# Patient Record
Sex: MALE | Race: BLACK OR AFRICAN AMERICAN | NOT HISPANIC OR LATINO | Employment: OTHER | URBAN - METROPOLITAN AREA
[De-identification: names, ages, dates, MRNs, and addresses within clinical notes are randomized per-mention and may not be internally consistent; named-entity substitution may affect disease eponyms.]

---

## 2017-05-17 ENCOUNTER — OFFICE VISIT (OUTPATIENT)
Dept: PSYCHIATRY | Facility: CLINIC | Age: 82
End: 2017-05-17
Payer: MEDICARE

## 2017-05-17 VITALS
HEIGHT: 68 IN | SYSTOLIC BLOOD PRESSURE: 147 MMHG | WEIGHT: 179 LBS | HEART RATE: 69 BPM | DIASTOLIC BLOOD PRESSURE: 67 MMHG | BODY MASS INDEX: 27.13 KG/M2

## 2017-05-17 DIAGNOSIS — F45.21 HYPOCHONDRIASIS: Primary | ICD-10-CM

## 2017-05-17 DIAGNOSIS — K59.09 CHRONIC CONSTIPATION: ICD-10-CM

## 2017-05-17 DIAGNOSIS — F34.1 DYSTHYMIC DISORDER: ICD-10-CM

## 2017-05-17 PROCEDURE — 99999 PR PBB SHADOW E&M-EST. PATIENT-LVL III: CPT | Mod: PBBFAC,,, | Performed by: PSYCHIATRY & NEUROLOGY

## 2017-05-17 PROCEDURE — 90833 PSYTX W PT W E/M 30 MIN: CPT | Mod: S$GLB,,, | Performed by: PSYCHIATRY & NEUROLOGY

## 2017-05-17 PROCEDURE — 99499 UNLISTED E&M SERVICE: CPT | Mod: S$GLB,,, | Performed by: PSYCHIATRY & NEUROLOGY

## 2017-05-17 PROCEDURE — 99213 OFFICE O/P EST LOW 20 MIN: CPT | Mod: S$GLB,,, | Performed by: PSYCHIATRY & NEUROLOGY

## 2017-05-17 PROCEDURE — 1160F RVW MEDS BY RX/DR IN RCRD: CPT | Mod: S$GLB,,, | Performed by: PSYCHIATRY & NEUROLOGY

## 2017-05-17 PROCEDURE — 1159F MED LIST DOCD IN RCRD: CPT | Mod: S$GLB,,, | Performed by: PSYCHIATRY & NEUROLOGY

## 2017-05-17 RX ORDER — MIRTAZAPINE 15 MG/1
15 TABLET, FILM COATED ORAL NIGHTLY
Qty: 90 TABLET | Refills: 1 | Status: SHIPPED | OUTPATIENT
Start: 2017-05-17 | End: 2021-04-23 | Stop reason: ALTCHOICE

## 2017-05-17 NOTE — MR AVS SNAPSHOT
[facility] - Psychiatry  [address]  [place] [address]  Phone: [phone]  Fax: [phone]                  Ish aMradiaga   2017 2:30 PM   Office Visit    Description:  Male : 1932   Provider:  Robert Owens Jr., MD   Department:  [facility] - Psychiatry           Reason for Visit     Depression     Somatic Complaints           Diagnoses this Visit        Comments    Hypochondriasis    -  Primary     Dysthymic disorder         Chronic constipation                To Do List           Future Appointments        Provider Department Dept Phone    2017 1:00 PM MD Marshall Rey Jr. [facility] [phone]      Goals (5 Years of Data)     None      Follow-Up and Disposition     Return in about 6 months (around 2017).       These Medications        Disp Refills Start End    mirtazapine (REMERON) 15 MG tablet 90 tablet 1 2017     Take 1 tablet (15 mg total) by mouth every evening. - Oral    Pharmacy: [facility]/pharm[facility] #[id number] - [place] - [address]  #: [phone]         [facility]     [facility] Care Line -  Assistance  Unless otherwise directed by your provider, please contact [facility], our nurse care line that is available for  assistance.     Registered nurses in the [facility] provide: appointment scheduling, clinical advisement, health education, and other advisory services.  Call: [phone] (toll free)               Medications           Message regarding Medications     Verify the changes and/or additions to your medication regime listed below are the same as discussed with your clinician today.  If any of these changes or additions are incorrect, please notify your healthcare provider.             Verify that the below list of medications is an accurate representation of the medications you are currently taking.  If none reported, the list may be blank. If incorrect, please  "contact your healthcare provider. Carry this list with you in case of emergency.           Current Medications     docusate sodium (COLACE) 100 MG capsule Take 1 capsule (100 mg total) by mouth 2 (two) times daily as needed for Constipation.    mirtazapine (REMERON) 15 MG tablet Take 1 tablet (15 mg total) by mouth every evening.    RANITIDINE HCL (ZANTAC ORAL) Take by mouth.           Clinical Reference Information           Your Vitals Were     BP Pulse Height Weight BMI    147/67 69 5' 8" (1.727 m) 81.2 kg (179 lb) 27.22 kg/m2      Blood Pressure          Most Recent Value    BP  (!)  147/67      Allergies as of 5/17/2017     No Known Allergies      Immunizations Administered on Date of Encounter - 5/17/2017     None      Orders Placed During Today's Visit      Normal Orders This Visit    Psy Authorization: Pharm + Psychotherapy,  6 x/yr       [facility] Sign-Up     Activating your [facility] account is as easy as 1-2-3!     1) Visit [email or web address], select Sign Up Now, enter this activation code and your date of birth, then select Next.  [email or web address]  Expires: 7/1/2017  3:01 PM      2) Create a username and password to use when you visit [facility] in the future and select a security question in case you lose your password and select Next.    3) Enter your e-mail address and click Sign Up!    Additional Information  If you have questions, please e-mail [email or web address] or call [phone] to talk to our [facility] staff. Remember, [facility] is NOT to be used for urgent needs. For medical emergencies, dial 911.         Instructions    Call if problems arise.  Go to ER if in crisis.         Language Assistance Services     ATTENTION: Language assistance services are available, free of charge. Please call [phone].      ATENCIÓN: Si montana shi, tiene a harper disposición servicios gratuitos de asistencia lingüística. Llame al [phone].     CHÚ Ý: N?u b?n nói Ti?ng Vi?t, có các d?ch v? h? tr? ngôn " ng? mi?n phí dành cho b?n. G?i s? [phone].         [facility] complies with applicable Federal civil rights laws and does not discriminate on the basis of race, color, national origin, age, disability, or sex.

## 2017-05-17 NOTE — PROGRESS NOTES
Outpatient Psychiatry Follow-Up Visit (MD/NP)    5/17/2017    Clinical Status of Patient:  Outpatient (Ambulatory)    Chief Complaint:  Ish Maradiaga is a 84 y.o. male who presents today for follow-up of Depression and Somatic Complaints    Met with patient.      Interval History and Content of Current Session:  Interim Events/Subjective Report/Content of Current Session:  Pt returned casually dressed and groomed, ambulating slowly with a walker.  He provided an update on his activities, which have changed little since last year.  His major concern now is his son, who he says abuses drugs, has lost his job and girlfriend, and is now living with Pt full time.  Pt would like to believe that the drug use is secondary to his son not finding suitable employment, but he agreed that it could be the other way around and he may only be enabling his son.  He is realistic enough to consider his options, which include moving to a senior living apartment here or back in [place], [place], which he has discussed here before.    Medications were reviewed.  Pt is pleased with his present tx.  He still has constipation, but he has not been back to the ED with it as he frequently did in the past.  He does acknowledge a need to eat better.  Psychotropics were refilled unchanged and he will return here in 6 months.    Psychotherapy:  · Target symptoms: hypochondriasis.  anxiety. irritability.  depression.  · Why chosen therapy is appropriate versus another modality: relevant to diagnosis, patient responds to this modality  · Outcome monitoring methods: self-report, observation  · Therapeutic intervention type: behavior modifying psychotherapy, supportive psychotherapy  · Topics discussed/themes: stress related to medical comorbidities, building skills sets for symptom management  · The patient's response to the intervention is motivated. The patient's progress toward treatment goals is fair.   · Duration of intervention: 23 min    Review of  "Systems   · PSYCHIATRIC: Pertinant items are noted in the narrative.  · MUSCULOSKELETAL: leg weakness  · GASTROINTESTINAL: Constipation.    Past Medical, Family and Social History: The patient's past medical, family and social history have been reviewed and updated as appropriate within the electronic medical record - see encounter notes.    Compliance: yes    Side effects: None    Risk Parameters:  Patient reports no suicidal ideation  Patient reports no homicidal ideation  Patient reports no self-injurious behavior  Patient reports no violent behavior    Exam (detailed: at least 9 elements; comprehensive: all 15 elements)   Constitutional  Vitals:  Most recent vital signs, dated less than 90 days prior to this appointment, were reviewed.    BP (!) 147/67  Pulse 69  Ht 5' 8" (1.727 m)  Wt 81.2 kg (179 lb)  BMI 27.22 kg/m2     General:  age appropriate, casually dressed     Musculoskeletal  Muscle Strength/Tone:  no rigidity, no dyskinesia, no dystonia, no tremor   Gait & Station:  uses walker     Psychiatric  Speech:  spontaneous   Mood & Affect:  serious  congruent and appropriate   Thought Process:  goal-directed, logical   Associations:  intact   Thought Content:  normal, no suicidality, no homicidality, delusions, or paranoia He is focused on G.I. Symptoms.   Insight & Judgement:  good  good   Orientation:  grossly intact   Memory: intact for content of interview   Language: grossly intact   Attention Span & Concentration:  able to focus   Fund of Knowledge:  intact and appropriate to age and level of education     Assessment and Diagnosis   Status/Progress: Based on the examination today, the patient's problem(s) is/are adequately but not ideally controlled.  New problems have been presented today.   Comorbidities are not complicating management of the primary condition.  There are no active rule-out diagnoses for this patient at this time.    General Impression:  Pt has developed insight into his previous " illness and behaviors, as well as awareness of benefit from Remeron.    Axis I: SOMATOFORM DISORDERS; Hypochondriasis (300.7).  Axis II: NO DIAGNOSIS ON AXIS II (V71.09)  Axis III: healthy  Axis IV: problems with primary support group  Axis V: 71-80 If symptoms are present, they are transient and expectable reactions to psychosocial stressors (e.g. difficulty concentrating after family argument); no more than slight impairment in social, occupational or school functioning (e.g. temporarily falling behind in schoolwork)    Intervention/Counseling/Treatment Plan   · Medication Management: Continue current medications.   · Call if planning to move.    Return to Clinic: 6 months

## 2018-05-17 ENCOUNTER — PES CALL (OUTPATIENT)
Dept: ADMINISTRATIVE | Facility: CLINIC | Age: 83
End: 2018-05-17

## 2018-07-20 ENCOUNTER — PES CALL (OUTPATIENT)
Dept: ADMINISTRATIVE | Facility: CLINIC | Age: 83
End: 2018-07-20

## 2018-09-17 ENCOUNTER — PES CALL (OUTPATIENT)
Dept: ADMINISTRATIVE | Facility: CLINIC | Age: 83
End: 2018-09-17

## 2019-06-17 ENCOUNTER — PES CALL (OUTPATIENT)
Dept: ADMINISTRATIVE | Facility: CLINIC | Age: 84
End: 2019-06-17

## 2020-10-07 ENCOUNTER — PES CALL (OUTPATIENT)
Dept: ADMINISTRATIVE | Facility: CLINIC | Age: 85
End: 2020-10-07

## 2021-06-15 ENCOUNTER — PES CALL (OUTPATIENT)
Dept: ADMINISTRATIVE | Facility: CLINIC | Age: 86
End: 2021-06-15

## 2021-07-15 ENCOUNTER — EXTERNAL HOSPITAL ADMISSION (OUTPATIENT)
Dept: ADMINISTRATIVE | Facility: CLINIC | Age: 86
End: 2021-07-15

## 2021-07-15 ENCOUNTER — PATIENT OUTREACH (OUTPATIENT)
Dept: ADMINISTRATIVE | Facility: CLINIC | Age: 86
End: 2021-07-15

## 2021-07-15 DIAGNOSIS — Z20.822 ENCOUNTER FOR LABORATORY TESTING FOR COVID-19 VIRUS: ICD-10-CM

## 2021-07-22 ENCOUNTER — LAB VISIT (OUTPATIENT)
Dept: LAB | Facility: OTHER | Age: 86
End: 2021-07-22
Payer: MEDICARE

## 2021-07-22 DIAGNOSIS — Z20.822 ENCOUNTER FOR LABORATORY TESTING FOR COVID-19 VIRUS: ICD-10-CM

## 2021-07-22 PROCEDURE — U0003 INFECTIOUS AGENT DETECTION BY NUCLEIC ACID (DNA OR RNA); SEVERE ACUTE RESPIRATORY SYNDROME CORONAVIRUS 2 (SARS-COV-2) (CORONAVIRUS DISEASE [COVID-19]), AMPLIFIED PROBE TECHNIQUE, MAKING USE OF HIGH THROUGHPUT TECHNOLOGIES AS DESCRIBED BY CMS-2020-01-R: HCPCS | Performed by: NURSE PRACTITIONER

## 2021-07-23 LAB
SARS-COV-2 RNA RESP QL NAA+PROBE: NOT DETECTED
SARS-COV-2- CYCLE NUMBER: -1

## 2021-07-29 ENCOUNTER — LAB VISIT (OUTPATIENT)
Dept: LAB | Facility: OTHER | Age: 86
End: 2021-07-29
Payer: MEDICARE

## 2021-07-29 DIAGNOSIS — Z20.822 ENCOUNTER FOR LABORATORY TESTING FOR COVID-19 VIRUS: ICD-10-CM

## 2021-07-29 PROCEDURE — U0003 INFECTIOUS AGENT DETECTION BY NUCLEIC ACID (DNA OR RNA); SEVERE ACUTE RESPIRATORY SYNDROME CORONAVIRUS 2 (SARS-COV-2) (CORONAVIRUS DISEASE [COVID-19]), AMPLIFIED PROBE TECHNIQUE, MAKING USE OF HIGH THROUGHPUT TECHNOLOGIES AS DESCRIBED BY CMS-2020-01-R: HCPCS | Performed by: NURSE PRACTITIONER

## 2021-07-30 LAB
SARS-COV-2 RNA RESP QL NAA+PROBE: NOT DETECTED
SARS-COV-2- CYCLE NUMBER: -1

## 2021-08-05 ENCOUNTER — LAB VISIT (OUTPATIENT)
Dept: LAB | Facility: OTHER | Age: 86
End: 2021-08-05
Payer: MEDICARE

## 2021-08-05 DIAGNOSIS — Z20.822 ENCOUNTER FOR LABORATORY TESTING FOR COVID-19 VIRUS: ICD-10-CM

## 2021-08-05 PROCEDURE — U0003 INFECTIOUS AGENT DETECTION BY NUCLEIC ACID (DNA OR RNA); SEVERE ACUTE RESPIRATORY SYNDROME CORONAVIRUS 2 (SARS-COV-2) (CORONAVIRUS DISEASE [COVID-19]), AMPLIFIED PROBE TECHNIQUE, MAKING USE OF HIGH THROUGHPUT TECHNOLOGIES AS DESCRIBED BY CMS-2020-01-R: HCPCS | Performed by: NURSE PRACTITIONER

## 2021-08-07 LAB
SARS-COV-2 RNA RESP QL NAA+PROBE: NORMAL
TEST PERFORMANCE INFO SPEC: NORMAL

## 2021-08-12 ENCOUNTER — LAB VISIT (OUTPATIENT)
Dept: LAB | Facility: OTHER | Age: 86
End: 2021-08-12
Payer: MEDICARE

## 2021-08-12 DIAGNOSIS — Z20.822 ENCOUNTER FOR LABORATORY TESTING FOR COVID-19 VIRUS: ICD-10-CM

## 2021-08-12 PROCEDURE — U0003 INFECTIOUS AGENT DETECTION BY NUCLEIC ACID (DNA OR RNA); SEVERE ACUTE RESPIRATORY SYNDROME CORONAVIRUS 2 (SARS-COV-2) (CORONAVIRUS DISEASE [COVID-19]), AMPLIFIED PROBE TECHNIQUE, MAKING USE OF HIGH THROUGHPUT TECHNOLOGIES AS DESCRIBED BY CMS-2020-01-R: HCPCS | Performed by: NURSE PRACTITIONER

## 2021-08-13 LAB
SARS-COV-2 RNA RESP QL NAA+PROBE: NOT DETECTED
SARS-COV-2- CYCLE NUMBER: -1

## 2021-08-19 ENCOUNTER — LAB VISIT (OUTPATIENT)
Dept: LAB | Facility: OTHER | Age: 86
End: 2021-08-19
Payer: MEDICARE

## 2021-08-19 DIAGNOSIS — Z20.822 ENCOUNTER FOR LABORATORY TESTING FOR COVID-19 VIRUS: ICD-10-CM

## 2021-08-19 PROCEDURE — U0003 INFECTIOUS AGENT DETECTION BY NUCLEIC ACID (DNA OR RNA); SEVERE ACUTE RESPIRATORY SYNDROME CORONAVIRUS 2 (SARS-COV-2) (CORONAVIRUS DISEASE [COVID-19]), AMPLIFIED PROBE TECHNIQUE, MAKING USE OF HIGH THROUGHPUT TECHNOLOGIES AS DESCRIBED BY CMS-2020-01-R: HCPCS | Performed by: NURSE PRACTITIONER

## 2021-08-20 LAB
SARS-COV-2 RNA RESP QL NAA+PROBE: NOT DETECTED
SARS-COV-2- CYCLE NUMBER: -1

## 2021-08-26 ENCOUNTER — LAB VISIT (OUTPATIENT)
Dept: LAB | Facility: OTHER | Age: 86
End: 2021-08-26
Payer: MEDICARE

## 2021-08-26 DIAGNOSIS — Z20.822 ENCOUNTER FOR LABORATORY TESTING FOR COVID-19 VIRUS: ICD-10-CM

## 2021-08-26 PROCEDURE — U0003 INFECTIOUS AGENT DETECTION BY NUCLEIC ACID (DNA OR RNA); SEVERE ACUTE RESPIRATORY SYNDROME CORONAVIRUS 2 (SARS-COV-2) (CORONAVIRUS DISEASE [COVID-19]), AMPLIFIED PROBE TECHNIQUE, MAKING USE OF HIGH THROUGHPUT TECHNOLOGIES AS DESCRIBED BY CMS-2020-01-R: HCPCS | Performed by: NURSE PRACTITIONER

## 2021-08-29 LAB — SARS-COV-2 RNA RESP QL NAA+PROBE: NOT DETECTED

## 2022-10-29 ENCOUNTER — LAB VISIT (OUTPATIENT)
Dept: LAB | Facility: HOSPITAL | Age: 87
End: 2022-10-29
Attending: FAMILY MEDICINE
Payer: MEDICARE

## 2022-10-29 DIAGNOSIS — R36.9 URETHRAL DISCHARGE: Primary | ICD-10-CM

## 2022-10-29 PROCEDURE — 87077 CULTURE AEROBIC IDENTIFY: CPT | Performed by: FAMILY MEDICINE

## 2022-10-29 PROCEDURE — 87186 SC STD MICRODIL/AGAR DIL: CPT | Mod: 59 | Performed by: FAMILY MEDICINE

## 2022-10-29 PROCEDURE — 87070 CULTURE OTHR SPECIMN AEROBIC: CPT | Performed by: FAMILY MEDICINE

## 2022-11-01 LAB
BACTERIA SPEC AEROBE CULT: ABNORMAL
BACTERIA SPEC AEROBE CULT: ABNORMAL

## 2023-04-15 ENCOUNTER — APPOINTMENT (OUTPATIENT)
Dept: LAB | Facility: HOSPITAL | Age: 88
End: 2023-04-15
Attending: NURSE PRACTITIONER
Payer: MEDICARE

## 2023-04-15 DIAGNOSIS — R36.9 URETHRAL DISCHARGE: Primary | ICD-10-CM

## 2023-04-15 PROCEDURE — 87186 SC STD MICRODIL/AGAR DIL: CPT

## 2023-04-15 PROCEDURE — 87070 CULTURE OTHR SPECIMN AEROBIC: CPT

## 2023-04-15 PROCEDURE — 87077 CULTURE AEROBIC IDENTIFY: CPT

## 2023-04-18 LAB — BACTERIA SPEC AEROBE CULT: ABNORMAL

## 2023-06-05 ENCOUNTER — TELEPHONE (OUTPATIENT)
Dept: UROLOGY | Facility: CLINIC | Age: 88
End: 2023-06-05
Payer: MEDICARE

## 2023-06-06 ENCOUNTER — OFFICE VISIT (OUTPATIENT)
Dept: UROLOGY | Facility: CLINIC | Age: 88
End: 2023-06-06
Payer: MEDICARE

## 2023-06-06 VITALS
DIASTOLIC BLOOD PRESSURE: 57 MMHG | HEIGHT: 68 IN | HEART RATE: 60 BPM | BODY MASS INDEX: 25.7 KG/M2 | SYSTOLIC BLOOD PRESSURE: 105 MMHG

## 2023-06-06 DIAGNOSIS — N13.8 BPH WITH OBSTRUCTION/LOWER URINARY TRACT SYMPTOMS: Primary | ICD-10-CM

## 2023-06-06 DIAGNOSIS — N40.1 BPH WITH OBSTRUCTION/LOWER URINARY TRACT SYMPTOMS: Primary | ICD-10-CM

## 2023-06-06 LAB — POC RESIDUAL URINE VOLUME: 122 ML (ref 0–100)

## 2023-06-06 PROCEDURE — 99212 OFFICE O/P EST SF 10 MIN: CPT | Mod: PBBFAC,PO | Performed by: UROLOGY

## 2023-06-06 PROCEDURE — 51798 US URINE CAPACITY MEASURE: CPT | Mod: PBBFAC,PO | Performed by: UROLOGY

## 2023-06-06 PROCEDURE — 99204 OFFICE O/P NEW MOD 45 MIN: CPT | Mod: S$PBB,,, | Performed by: UROLOGY

## 2023-06-06 PROCEDURE — 99204 PR OFFICE/OUTPT VISIT, NEW, LEVL IV, 45-59 MIN: ICD-10-PCS | Mod: S$PBB,,, | Performed by: UROLOGY

## 2023-06-06 PROCEDURE — 99999 PR PBB SHADOW E&M-EST. PATIENT-LVL II: CPT | Mod: PBBFAC,,, | Performed by: UROLOGY

## 2023-06-06 PROCEDURE — 99999 PR PBB SHADOW E&M-EST. PATIENT-LVL II: ICD-10-PCS | Mod: PBBFAC,,, | Performed by: UROLOGY

## 2023-06-06 RX ORDER — DEXTROMETHORPHAN HYDROBROMIDE, GUAIFENESIN 5; 100 MG/5ML; MG/5ML
650 LIQUID ORAL EVERY 8 HOURS
COMMUNITY

## 2023-06-06 RX ORDER — ONDANSETRON 4 MG/1
4 TABLET, ORALLY DISINTEGRATING ORAL ONCE
COMMUNITY

## 2023-06-06 RX ORDER — PANTOPRAZOLE SODIUM 40 MG/1
40 TABLET, DELAYED RELEASE ORAL DAILY
COMMUNITY

## 2023-06-06 RX ORDER — MELATONIN 5 MG
CAPSULE ORAL
COMMUNITY

## 2023-06-06 RX ORDER — ZINC SULFATE 50(220)MG
220 CAPSULE ORAL DAILY
COMMUNITY

## 2023-06-06 RX ORDER — DEXTROMETHORPHAN POLISTIREX 30 MG/5ML
60 SUSPENSION ORAL 2 TIMES DAILY
COMMUNITY

## 2023-06-06 RX ORDER — CITALOPRAM 20 MG/1
20 TABLET, FILM COATED ORAL DAILY
COMMUNITY

## 2023-06-06 RX ORDER — DIPHENHYDRAMINE HCL 25 MG
25 TABLET ORAL NIGHTLY PRN
COMMUNITY

## 2023-06-06 RX ORDER — PROMETHAZINE HYDROCHLORIDE 25 MG/1
25 TABLET ORAL EVERY 6 HOURS PRN
COMMUNITY

## 2023-06-06 NOTE — PROGRESS NOTES
"[facility] Urology New Patient/H&P:     Ish Maradiaga is a 89 y.o. male who presents for "cant urinate, foreskin retraction"    He is dropped off by [facility]  Pt is alone  States he has no trouble urinating  Wears a diaper  Goes in the diaper  I am provided with a facesheet from NH noting current dx: "weakness, anemia, MDD" and provides med list. Page 2 lists dx "n/v, abdominal pain, diarrhea, urethral discharge"  Page 3 adds dx "reduced mobility, HTN, constipation, BPH, abnormality of gait, CHF, PVD, personal history of malignant neoplasm of bladder"    Patient denies history of bladder cancer.  Denies any difficulty voiding.  After bladder scan on arrival as he can not provide urine sample of 122, then noted that he urinated again in his diaper.  He reports this is his baseline.  He feels that he has no problems.  He does not know why he is here.  He also denies any trouble with his foreskin    Past Medical History:   Diagnosis Date    Age-related nuclear cataract, bilateral     Anxiety disorder, unspecified     BPH (benign prostatic hyperplasia)     Chronic constipation     Constipation     Dementia     Depression     Functional dyspepsia     Gastritis     Heart failure, unspecified     History of psychiatric care     Other lack of coordination     Peripheral vascular disease, unspecified     Personal history of other diseases of urinary system     Psychiatric problem     Therapy     Vitamin deficiency        Past Surgical History:   Procedure Laterality Date    CERVICAL SPINE SURGERY      HERNIA REPAIR  , 1992    x2       Family History   Problem Relation Age of Onset    Cancer Mother     Depression Brother     Alcohol abuse Brother        Social History     Socioeconomic History    Marital status: Single   Tobacco Use    Smoking status: Former     Types: Cigarettes     Quit date: 1980     Years since quittin.4    Tobacco comments:     quit    Substance and Sexual Activity    " "Alcohol use: No     Comment: "very light" states last etoh about 3 years ago (5/18/13)    Drug use: No    Sexual activity: Never       Review of patient's allergies indicates:  No Known Allergies    Medications Reviewed: see MAR    Focused Physical Exam    Vitals:    06/06/23 1004   BP: (!) 105/57   Pulse: 60     Body mass index is 25.7 kg/m².   Height: 5' 8" (172.7 cm)       Abdomen: Soft, non-tender, nondistended, no CVA tenderness  In wheelchair  : uncirc normal phallus, normal meatus, diaper with urine, foreskin retracts and reduces normally, no discharge      LABS:    Recent Results (from the past 336 hour(s))   POCT Bladder Scan    Collection Time: 06/06/23 10:00 AM   Result Value Ref Range    POC Residual Urine Volume 122 (A) 0 - 100 mL         Assessment/Diagnosis:    1. BPH with obstruction/lower urinary tract symptoms  POCT Bladder Scan          Plans:  Patient denies difficulty urinating or difficulty with foreskin which were the reasons for visit.  He is by himself, brought only by nursing home transport.  I was able to review all records provided including medication list which has extensive bowel regimen.  We discuss the implications of constipation on urinary urgency frequency and incomplete emptying.  He states he simply wears depend because of his mobility and just urinates in his depend when he feels like it and has no problem.  Certainly has no phimosis or difficulty retracting foreskin on exam.  No urethral discharge which was listed as a diagnosis on his paperwork.  He likely has BPH given his age, and may have an element of postvoid residual, and therefore recommended starting Flomax 0.4 mg nightly which I did put on his progress note an order sent back to the nursing home.  As well, noted the line about history of bladder cancer on his face sheet, which he denies.  We discussed possible cystoscopic evaluation or further evaluation but he declines.  Certainly if he has any blood in the urine " in the future or truly can not urinate and goes retention can return for evaluation but otherwise can utilize Flomax 0.4 mg nightly and follow-up as needed.  Will attempted to call son.  These recommendations were provided on physicians progress note and orders signed and returned to [place].    Level of Medical Decision Making  [ _ ]  Low: 2 minor/1 stable chronic/1 acute uncomplicated --- review record/result/order test x2  [ X ]  Mod: 1 chronic exac/2stable chronic/1 acute comp --- review record/result/order test x3 OR independent interp of outside test OR discuss mgmt of outside ordered test --- start drug therapy/plan minor surgery   [ _ ]  High: chronic with exacerbation/progression or trtmnt side effect vs acute/chronic w/ life/body threat ---  (2/3) review record/result/order test x3 OR independent interp of ouutside test OR discuss mgmt of outside ordered test --- drug with monitoring for toxicity, plan major surgery, hospitalize, deescalate/withdraw care bc of prognosis

## 2023-06-11 RX ORDER — TAMSULOSIN HYDROCHLORIDE 0.4 MG/1
0.4 CAPSULE ORAL NIGHTLY
Qty: 30 CAPSULE | Refills: 11
Start: 2023-06-11 | End: 2024-06-10

## 2024-05-30 ENCOUNTER — OFFICE VISIT (OUTPATIENT)
Dept: UROLOGY | Facility: CLINIC | Age: 89
End: 2024-05-30
Payer: MEDICARE

## 2024-05-30 DIAGNOSIS — N13.8 BPH WITH OBSTRUCTION/LOWER URINARY TRACT SYMPTOMS: Primary | ICD-10-CM

## 2024-05-30 DIAGNOSIS — N40.1 BPH WITH OBSTRUCTION/LOWER URINARY TRACT SYMPTOMS: Primary | ICD-10-CM

## 2024-05-30 PROCEDURE — 99214 OFFICE O/P EST MOD 30 MIN: CPT | Mod: S$PBB,,, | Performed by: NURSE PRACTITIONER

## 2024-05-30 PROCEDURE — 99213 OFFICE O/P EST LOW 20 MIN: CPT | Mod: PBBFAC,PO | Performed by: NURSE PRACTITIONER

## 2024-05-30 PROCEDURE — 99999 PR PBB SHADOW E&M-EST. PATIENT-LVL III: CPT | Mod: PBBFAC,,, | Performed by: NURSE PRACTITIONER

## 2024-05-30 RX ORDER — POTASSIUM CHLORIDE 750 MG/1
10 TABLET, EXTENDED RELEASE ORAL DAILY
COMMUNITY

## 2024-05-30 RX ORDER — FUROSEMIDE 80 MG/1
80 TABLET ORAL DAILY
COMMUNITY

## 2024-05-30 RX ORDER — GUAIFENESIN 100 MG/5ML
200 SOLUTION ORAL 3 TIMES DAILY PRN
COMMUNITY

## 2024-05-30 RX ORDER — POLYVINYL ALCOHOL 14 MG/ML
1 SOLUTION/ DROPS OPHTHALMIC 2 TIMES DAILY
COMMUNITY

## 2024-05-30 RX ORDER — ASCORBIC ACID 500 MG
500 TABLET ORAL DAILY
COMMUNITY

## 2024-05-30 NOTE — PROGRESS NOTES
"[facility] Clinic Note  Staff: KRYSTA Jain    PCP: N/A  Pt resides at [facility]    Chief Complaint: LUTS    Subjective:        HPI: Ish Maradiaga is a 89 y.o. male presents today for evaluation of urinary frequency today.  He is in office Alone, dropped off by Nursing home facility.    Pt was last evaluated by Dr. Gan on 6/6/2023 for inability to urinate and foreskin retraction.    OV with MD 6/6/23:  He is dropped off by [facility]  Pt is alone  States he has no trouble urinating  Wears a diaper  Goes in the diaper  I am provided with a facesheet from NH noting current dx: "weakness, anemia, MDD" and provides med list. Page 2 lists dx "n/v, abdominal pain, diarrhea, urethral discharge"  Page 3 adds dx "reduced mobility, HTN, constipation, BPH, abnormality of gait, CHF, PVD, personal history of malignant neoplasm of bladder"     Patient denies history of bladder cancer.  Denies any difficulty voiding.  After bladder scan on arrival as he can not provide urine sample of 122, then noted that he urinated again in his diaper.  He reports this is his baseline.  He feels that he has no problems.  He does not know why he is here.  He also denies any trouble with his foreskin    MD Examination:  Abdomen: Soft, non-tender, nondistended, no CVA tenderness  In wheelchair  : uncirc normal phallus, normal meatus, diaper with urine, foreskin retracts and reduces normally, no discharge    POC by Dr. Gan on conclusion of last OV (6/6/23):  Patient denies difficulty urinating or difficulty with foreskin which were the reasons for visit.  He is by himself, brought only by nursing home transport.  I was able to review all records provided including medication list which has extensive bowel regimen.  We discuss the implications of constipation on urinary urgency frequency and incomplete emptying.  He states he simply wears depend because of his mobility and just urinates " in his depend when he feels like it and has no problem.  Certainly has no phimosis or difficulty retracting foreskin on exam.  No urethral discharge which was listed as a diagnosis on his paperwork.  He likely has BPH given his age, and may have an element of postvoid residual, and therefore recommended starting Flomax 0.4 mg nightly which I did put on his progress note an order sent back to the nursing home.  As well, noted the line about history of bladder cancer on his face sheet, which he denies.  We discussed possible cystoscopic evaluation or further evaluation but he declines.  Certainly if he has any blood in the urine in the future or truly can not urinate and goes retention can return for evaluation but otherwise can utilize Flomax 0.4 mg nightly and follow-up as needed.  Will attempted to call son.  These recommendations were provided on physicians progress note and orders signed and returned to [place].    Interval Hx 5/30/24:  Pt returns to clinic today for evaluation of possible urinary frequency.  Pt is unsure why he is here.  Pt wears Depends/Diapers 24/7 at this time.  PVR is 70 mL  Pt is currently taking Lasix 80 mg daily.  Pt denies gross hematuria, dysuria and difficulty urinating at this time.    REVIEW OF SYSTEMS:  A comprehensive 10 system review was performed and is negative except as noted above in HPI    PMHx:  Past Medical History:   Diagnosis Date    Age-related nuclear cataract, bilateral     Anxiety disorder, unspecified     BPH (benign prostatic hyperplasia)     Chronic constipation     Constipation     Dementia     Depression     Functional dyspepsia     Gastritis     Heart failure, unspecified     History of psychiatric care     Other lack of coordination     Peripheral vascular disease, unspecified     Personal history of other diseases of urinary system     Psychiatric problem     Therapy     Vitamin deficiency      PSHx:  Past Surgical History:   Procedure Laterality Date  "   CERVICAL SPINE SURGERY      HERNIA REPAIR  1991, 1992    x2       Allergies:  Patient has no known allergies.    Medications: reviewed     Objective:   There were no vitals filed for this visit.    General:WDWN in NAD  Eyes: PERRLA, normal conjunctiva  Respiratory: no increased work on breathing, clear to auscultation  Cardiovascular: regular rate and rhythm. No obvious extremity edema.  GI: palpation of masses. No tenderness. No hepatosplenomegaly to palpation.  Musculoskeletal: normal range of motion of bilateral upper extremities. Normal muscle strength and tone.  Skin: no obvious rashes or lesions. No tightening of skin noted.  Neurologic: CN grossly normal. Normal sensation.   Psychiatric: awake, alert and oriented x 3. Mood and affect normal. Cooperative.    Assessment:       1. BPH with obstruction/lower urinary tract symptoms          Plan:     Urinary frequency most likely due to increase in Lasix to 80 mg daily at this time.    "START TIMED VOIDING/BLADDER TRAINING" ASAP!!:  WHETHER YOU FEEL AN URGE TO URINATE OR NOT, YOU SHOULD BE FREQUENTING THE TOILET AND ATTEMPT TO URINATE EVERY 3 HOURS!!  NEVER POSTPONE URINATING OR HOLD YOUR URINE.    MAKE SURE YOU ARE HAVING REGULAR/NORMAL BOWEL MOVEMENTS AS THIS ALSO WILL HAVE AN EFFECT ON HOW YOUR BLADDER FUNCTIONS.    NOTHING TO EAT OR DRINK BY MOUTH (THIS INCLUDES WATER) AT LEAST 1-2 HOURS PRIOR TO YOUR BEDTIME ROUTINE.     F/u as needed.      Maria Fernanda Munguia, FNP-C    "

## 2024-08-26 ENCOUNTER — TELEPHONE (OUTPATIENT)
Dept: UROLOGY | Facility: CLINIC | Age: 89
End: 2024-08-26
Payer: MEDICARE

## 2024-08-26 NOTE — TELEPHONE ENCOUNTER
Spoke with oma ibarra previous patient of  needs to be checked for sti. Patient has blisters on penis. Appointment scheduled with np on 8/29

## 2024-08-26 NOTE — TELEPHONE ENCOUNTER
----- Message from Lacey Ortiz sent at 8/26/2024 11:27 AM CDT -----  Type:  Sooner Appointment Request    Caller is requesting a sooner appointment.  Caller declined first available appointment listed below.  Caller will not accept being placed on the waitlist and is requesting a message be sent to doctor.    Name of Caller:  gabriele talbert / oma ibarra   When is the first available appointment?  N/a  Symptoms:  uti , hosp f/u  Would the patient rather a call back or a response via [facility]? call  Best Call Back Number:  [phone]     Additional Information:  please advise

## 2024-08-29 ENCOUNTER — OFFICE VISIT (OUTPATIENT)
Dept: UROLOGY | Facility: CLINIC | Age: 89
End: 2024-08-29
Payer: MEDICARE

## 2024-08-29 ENCOUNTER — TELEPHONE (OUTPATIENT)
Dept: UROLOGY | Facility: CLINIC | Age: 89
End: 2024-08-29

## 2024-08-29 DIAGNOSIS — Z11.3 SCREENING FOR STDS (SEXUALLY TRANSMITTED DISEASES): ICD-10-CM

## 2024-08-29 DIAGNOSIS — N40.1 BPH WITH OBSTRUCTION/LOWER URINARY TRACT SYMPTOMS: ICD-10-CM

## 2024-08-29 DIAGNOSIS — N48.9 LESION OF PENIS: Primary | ICD-10-CM

## 2024-08-29 DIAGNOSIS — N13.8 BPH WITH OBSTRUCTION/LOWER URINARY TRACT SYMPTOMS: ICD-10-CM

## 2024-08-29 PROCEDURE — 99999 PR PBB SHADOW E&M-EST. PATIENT-LVL III: CPT | Mod: PBBFAC,,, | Performed by: NURSE PRACTITIONER

## 2024-08-29 PROCEDURE — G2211 COMPLEX E/M VISIT ADD ON: HCPCS | Mod: S$PBB,,, | Performed by: NURSE PRACTITIONER

## 2024-08-29 PROCEDURE — 99214 OFFICE O/P EST MOD 30 MIN: CPT | Mod: S$PBB,,, | Performed by: NURSE PRACTITIONER

## 2024-08-29 PROCEDURE — 99213 OFFICE O/P EST LOW 20 MIN: CPT | Mod: PBBFAC,PO | Performed by: NURSE PRACTITIONER

## 2024-08-29 RX ORDER — CLOTRIMAZOLE AND BETAMETHASONE DIPROPIONATE 10; .64 MG/G; MG/G
CREAM TOPICAL 2 TIMES DAILY
Qty: 1 EACH | Refills: 2 | Status: SHIPPED | OUTPATIENT
Start: 2024-08-29

## 2024-08-29 RX ORDER — VALACYCLOVIR HYDROCHLORIDE 1 G/1
1000 TABLET, FILM COATED ORAL 2 TIMES DAILY
Qty: 60 TABLET | Refills: 11 | Status: SHIPPED | OUTPATIENT
Start: 2024-08-29 | End: 2025-08-29

## 2024-08-29 RX ORDER — TAMSULOSIN HYDROCHLORIDE 0.4 MG/1
0.4 CAPSULE ORAL NIGHTLY
Qty: 90 CAPSULE | Refills: 0 | Status: SHIPPED | OUTPATIENT
Start: 2024-08-29 | End: 2024-11-27

## 2024-08-29 NOTE — TELEPHONE ENCOUNTER
----- Message from Nancy Tineo sent at 8/29/2024  2:57 PM CDT -----  Type: Needs Medical Advice  Who Called:  pt nursing home   Pharmacy name and phone #:    Best Call Back Number: [phone]  Additional Information: pt is calling the office because pr was prescribed valACYclovir (VALTREX) 1000 MG tablet and lozotone cream, is it a stop date??? Please call back to advise.

## 2024-08-29 NOTE — PROGRESS NOTES
"[facility] Note  Staff: KRYSTA Jain    PCP: N/A  *Pt is at [facility]  ZAINAB Nur  No. [phone]    Chief Complaint: Penile sores/blisters    Subjective:        HPI: Ish Maradiaga is a 89 y.o. male presents today for evaluation of blisters on penis at this time.    Pt currently resides at [facility].  Staff contacted our office a few days ago stating pt had "blisters on his penis".    Pt was last evaluated by me on 5/30/24 for urinary frequency.  Pt was last evaluated by Dr. Gan on 6/6/2023 for inability to urinate and foreskin retraction.     OV with MD 6/6/23:  He is dropped off by [facility]  Pt is alone  States he has no trouble urinating  Wears a diaper  Goes in the diaper  I am provided with a facesheet from NH noting current dx: "weakness, anemia, MDD" and provides med list. Page 2 lists dx "n/v, abdominal pain, diarrhea, urethral discharge"  Page 3 adds dx "reduced mobility, HTN, constipation, BPH, abnormality of gait, CHF, PVD, personal history of malignant neoplasm of bladder"     Patient denies history of bladder cancer.  Denies any difficulty voiding.  After bladder scan on arrival as he can not provide urine sample of 122, then noted that he urinated again in his diaper.  He reports this is his baseline.  He feels that he has no problems.  He does not know why he is here.  He also denies any trouble with his foreskin     MD Examination:  Abdomen: Soft, non-tender, nondistended, no CVA tenderness  In wheelchair  : uncirc normal phallus, normal meatus, diaper with urine, foreskin retracts and reduces normally, no discharge     POC by Dr. Gan on conclusion of last OV (6/6/23):  Patient denies difficulty urinating or difficulty with foreskin which were the reasons for visit.  He is by himself, brought only by nursing home transport.  I was able to review all records provided including medication list which has " extensive bowel regimen.  We discuss the implications of constipation on urinary urgency frequency and incomplete emptying.  He states he simply wears depend because of his mobility and just urinates in his depend when he feels like it and has no problem.  Certainly has no phimosis or difficulty retracting foreskin on exam.  No urethral discharge which was listed as a diagnosis on his paperwork.  He likely has BPH given his age, and may have an element of postvoid residual, and therefore recommended starting Flomax 0.4 mg nightly which I did put on his progress note an order sent back to the nursing home.  As well, noted the line about history of bladder cancer on his face sheet, which he denies.  We discussed possible cystoscopic evaluation or further evaluation but he declines.  Certainly if he has any blood in the urine in the future or truly can not urinate and goes retention can return for evaluation but otherwise can utilize Flomax 0.4 mg nightly and follow-up as needed.  Will attempted to call son.  These recommendations were provided on physicians progress note and orders signed and returned to [place] [facility].     Interval Hx 5/30/24:  Pt returns to clinic today for evaluation of possible urinary frequency.  Pt is unsure why he is here.  Pt wears Depends/Diapers 24/7 at this time.  PVR is 70 mL  Pt is currently taking Lasix 80 mg daily.  Pt denies gross hematuria, dysuria and difficulty urinating at this time.    8/29/24:  Pt arrives today with c/o penile blisters  Pt is sedentary, mostly sits in WC all day.  Pt denies dysuria and gross hematuria at this time.    REVIEW OF SYSTEMS:  A comprehensive 10 system review was performed and is negative except as noted above in HPI    PMHx:  Past Medical History:   Diagnosis Date    Age-related nuclear cataract, bilateral     Anxiety disorder, unspecified     BPH (benign prostatic hyperplasia)     Chronic constipation     Constipation     Dementia     Depression      Functional dyspepsia     Gastritis     Heart failure, unspecified     History of psychiatric care     Other lack of coordination     Peripheral vascular disease, unspecified     Personal history of other diseases of urinary system     Psychiatric problem     Therapy     Vitamin deficiency      PSHx:  Past Surgical History:   Procedure Laterality Date    CERVICAL SPINE SURGERY      HERNIA REPAIR  1991, 1992    x2     Allergies:  Patient has no known allergies.    Medications: reviewed     Objective:   There were no vitals filed for this visit.    General:WDWN in NAD  Eyes: PERRLA, normal conjunctiva  Respiratory: no increased work on breathing, clear to auscultation  Cardiovascular: regular rate and rhythm. No obvious extremity edema.  GI: palpation of masses. No tenderness. No hepatosplenomegaly to palpation.  Musculoskeletal: normal range of motion of bilateral upper extremities. Normal muscle strength and tone.  Skin: no obvious rashes or lesions. No tightening of skin noted.  Neurologic: CN grossly normal. Normal sensation.   Psychiatric: awake, alert and oriented x 3. Mood and affect normal. Cooperative.     Exam performed by me during OV today:  No scrotal rashes, cysts or lesions  Epididymis normal in size, no tenderness  Testes normal and size, equal size bilaterally, no masses  Urethral meatus normal without discharge  Penis is not circumcised, areas of excoriation observed to foreskin and head of penis on exam today.     Assessment:       1. Lesion of penis    2. BPH with obstruction/lower urinary tract symptoms    3. Screening for STDs (sexually transmitted diseases)          Plan:     RPR, HIV, Herpes Simplex Lab tests to be performed by nursing facility.  Trichomonas Vaginalis urine test to be done through lab at [facility].  Valtrex 1000 mg BID prescribed to pt on conclusion of OV today.  Lotrisone Cream-Apply to affected area twice daily x 2-3 weeks.    Hygiene care reviewed with pt and assistant during ov  today regarding foreskin of penis area.  All questions answered at this time.    We will contact SNF after we receive and review pending lab results for plan of care.    KAUSHIK Fairbanks-DONATO  Visit today is associated with current or anticipated ongoing medical care related to this patient's single serious condition/complex condition.

## 2024-08-29 NOTE — TELEPHONE ENCOUNTER
Spoke with ILA Olson. Rx : valtrex 1000mg bid, no stop date  Lotrisone cream apply twice daily for 2-3 weeks. Verbally voiced understanding

## 2024-11-08 ENCOUNTER — HOSPITAL ENCOUNTER (INPATIENT)
Facility: HOSPITAL | Age: 89
LOS: 1 days | Discharge: HOME | DRG: 690 | End: 2024-11-10
Attending: EMERGENCY MEDICINE | Admitting: STUDENT IN AN ORGANIZED HEALTH CARE EDUCATION/TRAINING PROGRAM
Payer: MEDICARE

## 2024-11-08 DIAGNOSIS — N47.6 BALANOPOSTHITIS: ICD-10-CM

## 2024-11-08 DIAGNOSIS — R31.9 HEMATURIA, UNSPECIFIED TYPE: Primary | ICD-10-CM

## 2024-11-08 DIAGNOSIS — N30.01 ACUTE CYSTITIS WITH HEMATURIA: ICD-10-CM

## 2024-11-08 PROBLEM — N40.0 BPH (BENIGN PROSTATIC HYPERPLASIA): Status: ACTIVE | Noted: 2024-11-08

## 2024-11-08 PROBLEM — N39.0 URINARY TRACT INFECTION WITH HEMATURIA: Status: ACTIVE | Noted: 2024-11-08

## 2024-11-08 LAB
ALBUMIN SERPL BCP-MCNC: 3.4 G/DL (ref 3.5–5.2)
ALP SERPL-CCNC: 80 U/L (ref 55–135)
ALT SERPL W/O P-5'-P-CCNC: 7 U/L (ref 10–44)
ANION GAP SERPL CALC-SCNC: 5 MMOL/L (ref 8–16)
AST SERPL-CCNC: 15 U/L (ref 10–40)
BACTERIA #/AREA URNS HPF: ABNORMAL /HPF
BASOPHILS # BLD AUTO: 0.03 K/UL (ref 0–0.2)
BASOPHILS NFR BLD: 0.4 % (ref 0–1.9)
BILIRUB SERPL-MCNC: 0.4 MG/DL (ref 0.1–1)
BILIRUB UR QL STRIP: NEGATIVE
BUN SERPL-MCNC: 21 MG/DL (ref 10–30)
CALCIUM SERPL-MCNC: 9.2 MG/DL (ref 8.7–10.5)
CHLORIDE SERPL-SCNC: 109 MMOL/L (ref 95–110)
CLARITY UR: ABNORMAL
CO2 SERPL-SCNC: 28 MMOL/L (ref 23–29)
COLOR UR: ABNORMAL
CREAT SERPL-MCNC: 1 MG/DL (ref 0.5–1.4)
DIFFERENTIAL METHOD BLD: ABNORMAL
EOSINOPHIL # BLD AUTO: 0.3 K/UL (ref 0–0.5)
EOSINOPHIL NFR BLD: 4.4 % (ref 0–8)
ERYTHROCYTE [DISTWIDTH] IN BLOOD BY AUTOMATED COUNT: 13.3 % (ref 11.5–14.5)
EST. GFR  (NO RACE VARIABLE): >60 ML/MIN/1.73 M^2
GLUCOSE SERPL-MCNC: 96 MG/DL (ref 70–110)
GLUCOSE UR QL STRIP: NEGATIVE
HCT VFR BLD AUTO: 34.9 % (ref 40–54)
HGB BLD-MCNC: 11.2 G/DL (ref 14–18)
HGB UR QL STRIP: ABNORMAL
HYALINE CASTS #/AREA URNS LPF: 13 /LPF
IMM GRANULOCYTES # BLD AUTO: 0.03 K/UL (ref 0–0.04)
IMM GRANULOCYTES NFR BLD AUTO: 0.4 % (ref 0–0.5)
INR PPP: 1.1 (ref 0.8–1.2)
KETONES UR QL STRIP: NEGATIVE
LEUKOCYTE ESTERASE UR QL STRIP: ABNORMAL
LIPASE SERPL-CCNC: 11 U/L (ref 4–60)
LYMPHOCYTES # BLD AUTO: 1.9 K/UL (ref 1–4.8)
LYMPHOCYTES NFR BLD: 25.1 % (ref 18–48)
MCH RBC QN AUTO: 31 PG (ref 27–31)
MCHC RBC AUTO-ENTMCNC: 32.1 G/DL (ref 32–36)
MCV RBC AUTO: 97 FL (ref 82–98)
MICROSCOPIC COMMENT: ABNORMAL
MONOCYTES # BLD AUTO: 0.9 K/UL (ref 0.3–1)
MONOCYTES NFR BLD: 11.7 % (ref 4–15)
NEUTROPHILS # BLD AUTO: 4.3 K/UL (ref 1.8–7.7)
NEUTROPHILS NFR BLD: 58 % (ref 38–73)
NITRITE UR QL STRIP: NEGATIVE
NRBC BLD-RTO: 0 /100 WBC
PH UR STRIP: >8 [PH] (ref 5–8)
PLATELET # BLD AUTO: 156 K/UL (ref 150–450)
PMV BLD AUTO: 10 FL (ref 9.2–12.9)
POTASSIUM SERPL-SCNC: 3.8 MMOL/L (ref 3.5–5.1)
PROT SERPL-MCNC: 6.1 G/DL (ref 6–8.4)
PROT UR QL STRIP: ABNORMAL
PROTHROMBIN TIME: 11.7 SEC (ref 9–12.5)
RBC # BLD AUTO: 3.61 M/UL (ref 4.6–6.2)
RBC #/AREA URNS HPF: >100 /HPF (ref 0–4)
SODIUM SERPL-SCNC: 142 MMOL/L (ref 136–145)
SP GR UR STRIP: 1.01 (ref 1–1.03)
URN SPEC COLLECT METH UR: ABNORMAL
UROBILINOGEN UR STRIP-ACNC: NEGATIVE EU/DL
WBC # BLD AUTO: 7.42 K/UL (ref 3.9–12.7)
WBC #/AREA URNS HPF: >100 /HPF (ref 0–5)
WBC CLUMPS URNS QL MICRO: ABNORMAL
YEAST URNS QL MICRO: ABNORMAL

## 2024-11-08 PROCEDURE — 63600175 PHARM REV CODE 636 W HCPCS: Performed by: STUDENT IN AN ORGANIZED HEALTH CARE EDUCATION/TRAINING PROGRAM

## 2024-11-08 PROCEDURE — 87086 URINE CULTURE/COLONY COUNT: CPT | Performed by: EMERGENCY MEDICINE

## 2024-11-08 PROCEDURE — 81001 URINALYSIS AUTO W/SCOPE: CPT | Performed by: EMERGENCY MEDICINE

## 2024-11-08 PROCEDURE — 83690 ASSAY OF LIPASE: CPT | Performed by: EMERGENCY MEDICINE

## 2024-11-08 PROCEDURE — 80053 COMPREHEN METABOLIC PANEL: CPT | Performed by: EMERGENCY MEDICINE

## 2024-11-08 PROCEDURE — 25000003 PHARM REV CODE 250: Performed by: INTERNAL MEDICINE

## 2024-11-08 PROCEDURE — 97165 OT EVAL LOW COMPLEX 30 MIN: CPT

## 2024-11-08 PROCEDURE — 96374 THER/PROPH/DIAG INJ IV PUSH: CPT

## 2024-11-08 PROCEDURE — 99285 EMERGENCY DEPT VISIT HI MDM: CPT | Mod: 25

## 2024-11-08 PROCEDURE — 85610 PROTHROMBIN TIME: CPT | Performed by: EMERGENCY MEDICINE

## 2024-11-08 PROCEDURE — G0378 HOSPITAL OBSERVATION PER HR: HCPCS

## 2024-11-08 PROCEDURE — 87186 SC STD MICRODIL/AGAR DIL: CPT | Performed by: EMERGENCY MEDICINE

## 2024-11-08 PROCEDURE — 25000003 PHARM REV CODE 250: Performed by: STUDENT IN AN ORGANIZED HEALTH CARE EDUCATION/TRAINING PROGRAM

## 2024-11-08 PROCEDURE — 97535 SELF CARE MNGMENT TRAINING: CPT

## 2024-11-08 PROCEDURE — 85025 COMPLETE CBC W/AUTO DIFF WBC: CPT | Performed by: EMERGENCY MEDICINE

## 2024-11-08 PROCEDURE — 97161 PT EVAL LOW COMPLEX 20 MIN: CPT

## 2024-11-08 RX ORDER — GLUCAGON 1 MG
1 KIT INJECTION
Status: DISCONTINUED | OUTPATIENT
Start: 2024-11-08 | End: 2024-11-10 | Stop reason: HOSPADM

## 2024-11-08 RX ORDER — LANOLIN ALCOHOL/MO/W.PET/CERES
800 CREAM (GRAM) TOPICAL
Status: DISCONTINUED | OUTPATIENT
Start: 2024-11-08 | End: 2024-11-10 | Stop reason: HOSPADM

## 2024-11-08 RX ORDER — CEFTRIAXONE 1 G/1
1 INJECTION, POWDER, FOR SOLUTION INTRAMUSCULAR; INTRAVENOUS
Status: COMPLETED | OUTPATIENT
Start: 2024-11-08 | End: 2024-11-08

## 2024-11-08 RX ORDER — FUROSEMIDE 40 MG/1
80 TABLET ORAL DAILY
Status: DISCONTINUED | OUTPATIENT
Start: 2024-11-08 | End: 2024-11-10 | Stop reason: HOSPADM

## 2024-11-08 RX ORDER — IBUPROFEN 200 MG
16 TABLET ORAL
Status: DISCONTINUED | OUTPATIENT
Start: 2024-11-08 | End: 2024-11-10 | Stop reason: HOSPADM

## 2024-11-08 RX ORDER — PANTOPRAZOLE SODIUM 40 MG/1
40 TABLET, DELAYED RELEASE ORAL
Status: DISCONTINUED | OUTPATIENT
Start: 2024-11-08 | End: 2024-11-10 | Stop reason: HOSPADM

## 2024-11-08 RX ORDER — VALACYCLOVIR HYDROCHLORIDE 500 MG/1
1000 TABLET, FILM COATED ORAL 2 TIMES DAILY
Status: DISCONTINUED | OUTPATIENT
Start: 2024-11-08 | End: 2024-11-10 | Stop reason: HOSPADM

## 2024-11-08 RX ORDER — NALOXONE HCL 0.4 MG/ML
0.02 VIAL (ML) INJECTION
Status: DISCONTINUED | OUTPATIENT
Start: 2024-11-08 | End: 2024-11-10 | Stop reason: HOSPADM

## 2024-11-08 RX ORDER — TAMSULOSIN HYDROCHLORIDE 0.4 MG/1
0.4 CAPSULE ORAL NIGHTLY
Status: DISCONTINUED | OUTPATIENT
Start: 2024-11-08 | End: 2024-11-10 | Stop reason: HOSPADM

## 2024-11-08 RX ORDER — ZINC SULFATE 50(220)MG
220 CAPSULE ORAL DAILY
Status: DISCONTINUED | OUTPATIENT
Start: 2024-11-08 | End: 2024-11-10 | Stop reason: HOSPADM

## 2024-11-08 RX ORDER — TALC
9 POWDER (GRAM) TOPICAL NIGHTLY PRN
Status: DISCONTINUED | OUTPATIENT
Start: 2024-11-08 | End: 2024-11-10 | Stop reason: HOSPADM

## 2024-11-08 RX ORDER — IBUPROFEN 400 MG/1
400 TABLET ORAL EVERY 6 HOURS PRN
Status: DISCONTINUED | OUTPATIENT
Start: 2024-11-08 | End: 2024-11-10 | Stop reason: HOSPADM

## 2024-11-08 RX ORDER — CITALOPRAM 20 MG/1
20 TABLET, FILM COATED ORAL DAILY
Status: DISCONTINUED | OUTPATIENT
Start: 2024-11-08 | End: 2024-11-10 | Stop reason: HOSPADM

## 2024-11-08 RX ORDER — CEFTRIAXONE 2 G/1
2 INJECTION, POWDER, FOR SOLUTION INTRAMUSCULAR; INTRAVENOUS
Status: DISCONTINUED | OUTPATIENT
Start: 2024-11-08 | End: 2024-11-10

## 2024-11-08 RX ORDER — AMOXICILLIN 250 MG
1 CAPSULE ORAL DAILY PRN
Status: DISCONTINUED | OUTPATIENT
Start: 2024-11-08 | End: 2024-11-10 | Stop reason: HOSPADM

## 2024-11-08 RX ORDER — ACETAMINOPHEN 325 MG/1
650 TABLET ORAL EVERY 4 HOURS PRN
Status: DISCONTINUED | OUTPATIENT
Start: 2024-11-08 | End: 2024-11-10 | Stop reason: HOSPADM

## 2024-11-08 RX ORDER — IBUPROFEN 200 MG
24 TABLET ORAL
Status: DISCONTINUED | OUTPATIENT
Start: 2024-11-08 | End: 2024-11-10 | Stop reason: HOSPADM

## 2024-11-08 RX ORDER — SODIUM,POTASSIUM PHOSPHATES 280-250MG
2 POWDER IN PACKET (EA) ORAL
Status: DISCONTINUED | OUTPATIENT
Start: 2024-11-08 | End: 2024-11-10 | Stop reason: HOSPADM

## 2024-11-08 RX ORDER — ASCORBIC ACID 500 MG
500 TABLET ORAL DAILY
Status: DISCONTINUED | OUTPATIENT
Start: 2024-11-08 | End: 2024-11-10 | Stop reason: HOSPADM

## 2024-11-08 RX ORDER — SODIUM CHLORIDE 0.9 % (FLUSH) 0.9 %
2 SYRINGE (ML) INJECTION EVERY 8 HOURS PRN
Status: DISCONTINUED | OUTPATIENT
Start: 2024-11-08 | End: 2024-11-10 | Stop reason: HOSPADM

## 2024-11-08 RX ORDER — POTASSIUM CHLORIDE 750 MG/1
10 CAPSULE, EXTENDED RELEASE ORAL DAILY
Status: DISCONTINUED | OUTPATIENT
Start: 2024-11-08 | End: 2024-11-10 | Stop reason: HOSPADM

## 2024-11-08 RX ORDER — POLYETHYLENE GLYCOL 3350 17 G/17G
17 POWDER, FOR SOLUTION ORAL DAILY
COMMUNITY

## 2024-11-08 RX ADMIN — CEFTRIAXONE SODIUM 2 G: 2 INJECTION, POWDER, FOR SOLUTION INTRAMUSCULAR; INTRAVENOUS at 09:11

## 2024-11-08 RX ADMIN — CEFTRIAXONE 1 G: 1 INJECTION, POWDER, FOR SOLUTION INTRAMUSCULAR; INTRAVENOUS at 04:11

## 2024-11-08 RX ADMIN — OXYCODONE HYDROCHLORIDE AND ACETAMINOPHEN 500 MG: 500 TABLET ORAL at 11:11

## 2024-11-08 RX ADMIN — POTASSIUM CHLORIDE 10 MEQ: 750 CAPSULE, EXTENDED RELEASE ORAL at 11:11

## 2024-11-08 RX ADMIN — VALACYCLOVIR 1000 MG: 500 TABLET, FILM COATED ORAL at 09:11

## 2024-11-08 RX ADMIN — FUROSEMIDE 80 MG: 40 TABLET ORAL at 11:11

## 2024-11-08 RX ADMIN — PANTOPRAZOLE SODIUM 40 MG: 40 TABLET, DELAYED RELEASE ORAL at 08:11

## 2024-11-08 RX ADMIN — TAMSULOSIN HYDROCHLORIDE 0.4 MG: 0.4 CAPSULE ORAL at 09:11

## 2024-11-08 RX ADMIN — CITALOPRAM HYDROBROMIDE 20 MG: 20 TABLET ORAL at 11:11

## 2024-11-08 RX ADMIN — ZINC SULFATE 220 MG (50 MG) CAPSULE 220 MG: CAPSULE at 11:11

## 2024-11-08 RX ADMIN — VALACYCLOVIR 1000 MG: 500 TABLET, FILM COATED ORAL at 11:11

## 2024-11-08 NOTE — PT/OT/SLP EVAL
Physical Therapy Evaluation    Patient Name:  Ish Maradiaga   MRN:  [id number]    Recommendations:     Discharge Recommendations:  (return to nursing home)   Discharge Equipment Recommendations: none   Barriers to discharge:  medical status    Assessment:     Ish Maradiaga is a 89 y.o. male admitted with a medical diagnosis of Urinary tract infection with hematuria.  He presents with the following impairments/functional limitations: weakness, impaired endurance, impaired self care skills, impaired functional mobility, gait instability, impaired balance, decreased lower extremity function, decreased safety awareness, pain, impaired cardiopulmonary response to activity.    Pt found in bed with HOB elevated. Pt agreeable to visit. Pt requries min A for all mobility. Fair sitting balance at EOB. Sit to stand with min A to RW. Pt ambulated 50 ft with RW and min A with forward flexed posture, poor foot placement, decrease foot clearance and high risk for falls    Rehab Prognosis: Fair; patient would benefit from acute skilled PT services to address these deficits and reach maximum level of function.    Recent Surgery: * No surgery found *      Plan:     During this hospitalization, patient to be seen 3 x/week to address the identified rehab impairments via gait training, therapeutic activities, therapeutic exercises, neuromuscular re-education and progress toward the following goals:    Plan of Care Expires:  12/08/24    Subjective     Chief Complaint: none stated  Patient/Family Comments/goals: none stated  Pain/Comfort:  Pain Rating 1: 0/10    Patients cultural, spiritual, Hinduism conflicts given the current situation: no    Living Environment:  Pt is a resident at [facility]  Prior to admission, patients level of function was supervision RW or wheelchair.  Equipment used at home: walker, rolling, wheelchair.  DME owned (not currently used): none.  Upon discharge, patient will have assistance from facility  caregivers.    Objective:     Communicated with RN prior to session.  Patient found HOB elevated with peripheral IV, telemetry  upon PT entry to room.    General Precautions: Standard, fall  Orthopedic Precautions:N/A   Braces: N/A  Respiratory Status: Room air    Exams:  RLE ROM: WFL  RLE Strength: 3+/5 throughout  LLE ROM: WFL  LLE Strength: 3+/5 throughout    Functional Mobility:  Bed Mobility:     Supine to Sit: minimum assistance  Sit to Supine: minimum assistance  Transfers:     Sit to Stand:  minimum assistance with rolling walker  Gait: 50 ft with RW and min A with above stated deficits      AM-PAC 6 CLICK MOBILITY  Total Score:16       Treatment & Education:  Pt educated on POC, discharge recommendation, importance of time OOB, need for assist with mobility, use of call bell to seek assistance as needed and fall prevention      Patient left HOB elevated with all lines intact, call button in reach, bed alarm on, nurse notified, and nursing student present.    GOALS:   Multidisciplinary Problems       Physical Therapy Goals          Problem: Physical Therapy    Goal Priority Disciplines Outcome Interventions   Physical Therapy Goal     PT, PT/OT Progressing    Description: Goals to be met by: 24     Patient will increase functional independence with mobility by performin. Supine to sit with Supervision  2. Sit to stand transfer with Supervision  3. Bed to chair transfer with Supervision using Rolling Walker  4. Gait  x 100 feet with Supervision using Rolling Walker.                              History:     Past Medical History:   Diagnosis Date    Age-related nuclear cataract, bilateral     Anxiety disorder, unspecified     BPH (benign prostatic hyperplasia)     Chronic constipation     Constipation     Dementia     Depression     Functional dyspepsia     Gastritis     Heart failure, unspecified     History of psychiatric care     Other lack of coordination     Peripheral vascular disease,  unspecified     Personal history of other diseases of urinary system     Psychiatric problem     Therapy     Vitamin deficiency        Past Surgical History:   Procedure Laterality Date    CERVICAL SPINE SURGERY      HERNIA REPAIR  1991, 1992    x2       Time Tracking:     PT Received On: 11/08/24  PT Start Time: 1045     PT Stop Time: 1055  PT Total Time (min): 10 min     Billable Minutes: Evaluation 10      11/08/2024

## 2024-11-08 NOTE — PLAN OF CARE
Problem: Infection  Goal: Absence of Infection Signs and Symptoms  Outcome: Progressing  Intervention: Prevent or Manage Infection  Flowsheets (Taken 11/8/2024 1007)  Isolation Precautions:   precautions initiated   contact     Problem: Skin Injury Risk Increased  Goal: Skin Health and Integrity  Outcome: Progressing  Intervention: Optimize Skin Protection  Flowsheets (Taken 11/8/2024 1007)  Pressure Reduction Techniques: frequent weight shift encouraged  Pressure Reduction Devices: foam padding utilized  Skin Protection: incontinence pads utilized  Head of Bed (HOB) Positioning: HOB at 30 degrees

## 2024-11-08 NOTE — PLAN OF CARE
[facility]  Initial Discharge Assessment       Primary Care Provider: No, Primary Doctor    Admission Diagnosis: Hematuria, unspecified type [R31.9]    Admission Date: 11/8/2024  Expected Discharge Date: 11/10/2024         Payor: MEDICARE / Plan: MEDICARE PART A & B / Product Type: Government /     Extended Emergency Contact Information  Primary Emergency Contact: Alirio Cat  Address: [address], [place] [id number] [place] [address]  Mobile Phone: [phone]  Relation: Son    Attempted to complete assessment at bedside.  Patient with dx of dementia, unable to reliably answer assessment questions. Attempted to contact patient's sonAlirio, with no answer.  Left voicemail.              [id number] - [place], [place] - [address]  [address]  [place] [address]  Phone: [phone] Fax: [phone]    [address] [address]  Phone: [phone] Fax: [phone]      Initial Assessment (most recent)       Adult Discharge Assessment - 11/08/24 1210          Discharge Assessment    Assessment Type Discharge Planning Assessment     Confirmed/corrected address, phone number and insurance No     Reason No family to provide information/patient unable to answer     If unable to respond/provide information was family/caregiver contacted? Other (see comments)   left message for pt's Alirio dutta    Reason For Admission hematuria     Prior to hospitilization cognitive status: Unable to Assess     Current cognitive status: Not Oriented to Time;Not Oriented to Place     Walking or Climbing Stairs ambulation difficulty, assistance 1 person;stair climbing difficulty, assistance 1 person;transferring difficulty, assistance 1 person     Dressing/Bathing Difficulty yes     Dressing/Bathing bathing difficulty, assistance 1 person;dressing difficulty, assistance 1 person      Home Accessibility --   unable to assess    Readmission within 30 days? No

## 2024-11-08 NOTE — CONSULTS
89-year-old male with gross hematuria  Abnormal penis on exam  Need circumcision  Can not rule out penile lesion  We will need circumcision with possible biopsy  And cystoscopy    Please have patient follow up with us as outpatient

## 2024-11-08 NOTE — CARE UPDATE
Patient is seen and examined.  Patient is an elderly 89-year-old male from a local nursing home with past medical history significant for dementia, congestive heart failure, benign prostatic hypertrophy, history of bladder carcinoma, peripheral vascular disease with been admitted with complaints of penile discharge and he hematuria.  Patient denies any urinary difficulties.  Patient does not follow-up with any urologist.  Patient denies any bladder or pelvic pain.  No recent fevers or chills reported.  Patient has been placed on intravenous ceftriaxone antibiotic therapy.  On exam, patient has scarred foreskin with evidence of genital herpes with grouped vesicle lesions involving under service of penis and anterior bilateral scrotum.  Patient not aware of any genital herpes history.  Urology evaluation pending.  Follow microbiology results.  PT and OT consultations.  Observe fall precautions.  Maintain patient on gastric ulcer prophylaxis and deep venous thrombosis prophylaxis.  Continue plan of care as outlined by Dr. Pires.

## 2024-11-08 NOTE — PT/OT/SLP EVAL
Occupational Therapy   Evaluation    Name: Ish Maradiaga  MRN: [id number]  Admitting Diagnosis: Urinary tract infection with hematuria  Recent Surgery: * No surgery found *      Recommendations:     Discharge Recommendations: Moderate Intensity Therapy  Discharge Equipment Recommendations:  none  Barriers to discharge:  None    Assessment:     Ish Maradiaga is a 89 y.o. male with a medical diagnosis of Urinary tract infection with hematuria.  He presents with general weakness. Performance deficits affecting function: weakness, impaired endurance, impaired self care skills, impaired functional mobility, gait instability, impaired balance, decreased safety awareness, decreased lower extremity function, decreased upper extremity function.      Rehab Prognosis: Fair; patient would benefit from acute skilled OT services to address these deficits and reach maximum level of function.       Plan:     Patient to be seen 5 x/week to address the above listed problems via self-care/home management, therapeutic activities, therapeutic exercises  Plan of Care Expires: 12/08/24  Plan of Care Reviewed with: patient    Subjective     Chief Complaint: General weakness  Patient/Family Comments/goals: Improved functional mobility and ADL independence.    Occupational Profile:  Living Environment: Resident at [facility]  Previous level of function: Requires some assistance with ADLs, ambulates using rolling walker and holds on to wheelchair arms.   Roles and Routines: Nursing Home Resident  Equipment Used at Home: wheelchair, walker, rolling  Assistance upon Discharge: Nursing Home Staff    Pain/Comfort:  Pain Rating 1: 0/10  Pain Rating Post-Intervention 1: 0/10    Patients cultural, spiritual, Buddhist conflicts given the current situation: no    Objective:     Communicated with: nurse prior to session.  Patient found HOB elevated with telemetry, peripheral IV upon OT entry to room.    General Precautions: Standard,  fall, contact  Orthopedic Precautions: N/A  Braces: N/A  Respiratory Status: Room air    Occupational Performance:    Bed Mobility:    Patient completed Scooting/Bridging with contact guard assistance  Patient completed Supine to Sit with contact guard assistance  Patient completed Sit to Supine with contact guard assistance  Performed unsupported sitting EOB with contact guard assistance.    Functional Mobility/Transfers:  Patient completed Sit <> Stand x1 Trial with minimum assistance using hand-held assist     Activities of Daily Living:  Lower Body Dressing: moderate assistance to don/doff socks sitting EOB.    Cognitive/Visual Perceptual:  Cognitive/Psychosocial Skills:     -       Oriented to: Person   -       Follows Commands/attention:Follows one-step commands  -       Communication: clear/fluent  -       Memory: Impaired STM and Poor immediate recall  -       Safety awareness/insight to disability: impaired   -       Mood/Affect/Coping skills/emotional control: Cooperative and Pleasant  Visual/Perceptual:      -Intact Acuity    Physical Exam:  Balance:    -       Sitting: Contact Guard, Standing: Minimal Assist  Upper Extremity Range of Motion:     -       Right Upper Extremity: WFL  -       Left Upper Extremity: WFL  Upper Extremity Strength:    -       Right Upper Extremity: 4/5  -       Left Upper Extremity: 4/5   Strength:    -       Right Upper Extremity: WFL  -       Left Upper Extremity: WFL  Fine Motor Coordination:    -       Intact    AMPAC 6 Click ADL:  AMPAC Total Score: 19    Treatment & Education:  Patient educated on the purpose of Occupational Therapy and the importance of getting OOB.    Patient left HOB elevated with all lines intact, call button in reach, and bed alarm on    GOALS:   Multidisciplinary Problems       Occupational Therapy Goals          Problem: Occupational Therapy    Goal Priority Disciplines Outcome Interventions   Occupational Therapy Goal     OT, PT/OT      Description: Goals to be met by: 12/8/2024     Patient will increase functional independence with ADLs by performing:    UE Dressing with Supervision.  LE Dressing with Supervision.  Grooming while standing at sink with Supervision.  Toileting from toilet with Supervision for hygiene and clothing management.   Toilet transfer to toilet with Supervision.  Perform sitting/standing ADL activity with no LOB.                         History:     Past Medical History:   Diagnosis Date    Age-related nuclear cataract, bilateral     Anxiety disorder, unspecified     BPH (benign prostatic hyperplasia)     Chronic constipation     Constipation     Dementia     Depression     Functional dyspepsia     Gastritis     Heart failure, unspecified     History of psychiatric care     Other lack of coordination     Peripheral vascular disease, unspecified     Personal history of other diseases of urinary system     Psychiatric problem     Therapy     Vitamin deficiency          Past Surgical History:   Procedure Laterality Date    CERVICAL SPINE SURGERY      HERNIA REPAIR  1991, 1992    x2       Time Tracking:     OT Date of Treatment: 11/08/24  OT Start Time: 1150  OT Stop Time: 1215  OT Total Time (min): 25 min    Billable Minutes:Evaluation 10  Self Care/Home Management 15    11/8/2024

## 2024-11-08 NOTE — PLAN OF CARE
Problem: Infection  Goal: Absence of Infection Signs and Symptoms  Outcome: Progressing  Intervention: Prevent or Manage Infection  Flowsheets (Taken 11/8/2024 1007)  Isolation Precautions:   precautions initiated   contact     Problem: Fall Injury Risk  Goal: Absence of Fall and Fall-Related Injury  Outcome: Progressing  Intervention: Identify and Manage Contributors  Flowsheets (Taken 11/8/2024 1625)  Self-Care Promotion:   independence encouraged   BADL personal objects within reach  Medication Review/Management:   medications reviewed   high-risk medications identified     Problem: Skin Injury Risk Increased  Goal: Skin Health and Integrity  Outcome: Progressing  Intervention: Optimize Skin Protection  Flowsheets (Taken 11/8/2024 1007)  Pressure Reduction Techniques: frequent weight shift encouraged  Pressure Reduction Devices: foam padding utilized  Skin Protection: incontinence pads utilized  Head of Bed (HOB) Positioning: HOB at 30 degrees

## 2024-11-08 NOTE — H&P
[facility] - Emergency Dept  Hospital Medicine  History & Physical    Patient Name: Ish Maradiaga  MRN: [id number]  Patient Class: OP- Observation  Admission Date: 11/8/2024  Attending Physician: Huan Pires MD   Primary Care Provider: [facility]         Patient information was obtained from patient and ER records.     Subjective:     Principal Problem:Urinary tract infection with hematuria    Chief Complaint:   Chief Complaint   Patient presents with    Hematuria     Patient c/o hematuria since yesterday        HPI: 89 year old male with PMH of BPH, CHF, dementia, h/o bladder cancer, depression, PVD, previous proteus UTI presented to ED from nursing home for gross hematuria for 1 day.  Denies previous occurrences.  Denies penile pain, abdominal pain, nausea/vomiting, dysuria.  Wears depend at baseline.  Denies fevers, weight loss, chest pain, SOB, nausea/vomiting, chills, diarrhea, rashes.    On examination patient noted to have discharge, gross blood oozing from penis, flat gray tinged macules on foreskin down to scrotum, excoriation of skin.  UA is positive for leuk esterase, >100 RBCs.  Patient was given IV ceftriaxone for UTI.      Past Medical History:   Diagnosis Date    Age-related nuclear cataract, bilateral     Anxiety disorder, unspecified     BPH (benign prostatic hyperplasia)     Chronic constipation     Constipation     Dementia     Depression     Functional dyspepsia     Gastritis     Heart failure, unspecified     History of psychiatric care     Other lack of coordination     Peripheral vascular disease, unspecified     Personal history of other diseases of urinary system     Psychiatric problem     Therapy     Vitamin deficiency        Past Surgical History:   Procedure Laterality Date    CERVICAL SPINE SURGERY      HERNIA REPAIR  1991, 1992    x2       Review of patient's allergies indicates:  No Known Allergies    No current facility-administered medications on file prior  to encounter.     Current Outpatient Medications on File Prior to Encounter   Medication Sig    acetaminophen (TYLENOL) 650 MG TbSR Take 650 mg by mouth every 8 (eight) hours.    ascorbic acid, vitamin C, (VITAMIN C) 500 MG tablet Take 500 mg by mouth once daily.    bisacodyL (FLEET) 10 mg/30 mL Enem Place 10 mg rectally once.    citalopram (CELEXA) 20 MG tablet Take 20 mg by mouth once daily.    clotrimazole-betamethasone 1-0.05% (LOTRISONE) cream Apply topically 2 (two) times daily.    dextromethorphan (DELSYM) 30 mg/5 mL liquid Take 60 mg by mouth 2 (two) times daily.    diphenhydrAMINE (SOMINEX) 25 mg tablet Take 25 mg by mouth nightly as needed for Insomnia.    furosemide (LASIX) 80 MG tablet Take 80 mg by mouth once daily.    guaiFENesin 100 mg/5 ml (ROBITUSSIN) 100 mg/5 mL syrup Take 200 mg by mouth 3 (three) times daily as needed for Cough.    melatonin 5 mg Cap Take by mouth.    ondansetron (ZOFRAN-ODT) 4 MG TbDL Take 4 mg by mouth once.    pantoprazole (PROTONIX) 40 MG tablet Take 40 mg by mouth once daily.    polyvinyl alcohol, artificial tears, (LIQUIFILM TEARS) 1.4 % ophthalmic solution 1 drop 2 (two) times daily.    potassium chloride (KLOR-CON) 10 MEQ TbSR Take 10 mEq by mouth once daily.    promethazine (PHENERGAN) 25 MG tablet Take 25 mg by mouth every 6 (six) hours as needed for Nausea.    RANITIDINE HCL (ZANTAC ORAL) Take by mouth.    tamsulosin (FLOMAX) 0.4 mg Cap Take 1 capsule (0.4 mg total) by mouth every evening.    valACYclovir (VALTREX) 1000 MG tablet Take 1 tablet (1,000 mg total) by mouth 2 (two) times daily.    zinc sulfate (ZINCATE) 50 mg zinc (220 mg) capsule Take 220 mg by mouth once daily.    [DISCONTINUED] vilazodone (VIIBRYD) 20 mg Tab Take by mouth.     Family History       Problem Relation (Age of Onset)    Alcohol abuse Brother    Cancer Mother    Depression Brother          Tobacco Use    Smoking status: Former     Current packs/day: 0.00     Types: Cigarettes     Quit date:  "1980     Years since quittin.8    Smokeless tobacco: Not on file    Tobacco comments:     quit    Substance and Sexual Activity    Alcohol use: No     Comment: "very light" states last etoh about 3 years ago (13)    Drug use: No    Sexual activity: Never     Review of Systems   Constitutional:  Negative for chills and fever.   HENT:  Negative for ear discharge and sore throat.    Eyes:  Negative for visual disturbance.   Respiratory:  Negative for chest tightness, shortness of breath and wheezing.    Cardiovascular:  Negative for chest pain, palpitations and leg swelling.   Gastrointestinal:  Negative for abdominal pain, blood in stool, diarrhea and vomiting.   Endocrine: Negative for polyuria.   Genitourinary:  Positive for hematuria, penile discharge and scrotal swelling. Negative for dysuria and testicular pain.   Musculoskeletal:  Negative for gait problem and myalgias.   Skin:  Positive for rash.   Neurological:  Negative for dizziness, weakness, numbness and headaches.   Psychiatric/Behavioral:  Negative for agitation.      Objective:     Vital Signs (Most Recent):  Temp: 98.5 °F (36.9 °C) (24 0337)  Pulse: 61 (24 0500)  Resp: 18 (24 0337)  BP: 124/60 (24 0500)  SpO2: 97 % (24 0500) Vital Signs (24h Range):  Temp:  [98.5 °F (36.9 °C)] 98.5 °F (36.9 °C)  Pulse:  [61-69] 61  Resp:  [18] 18  SpO2:  [97 %-98 %] 97 %  BP: (115-124)/(60-62) 124/60     Weight: 70.3 kg (155 lb)  Body mass index is 22.89 kg/m².     Physical Exam  Constitutional:       General: He is not in acute distress.     Appearance: Normal appearance. He is not toxic-appearing.   HENT:      Head: Normocephalic and atraumatic.      Nose: No congestion.      Mouth/Throat:      Mouth: Mucous membranes are moist.      Pharynx: Oropharynx is clear.   Eyes:      General: No scleral icterus.     Extraocular Movements: Extraocular movements intact.      Pupils: Pupils are equal, round, and reactive to light. " "  Cardiovascular:      Rate and Rhythm: Normal rate and regular rhythm.      Pulses: Normal pulses.      Heart sounds: Normal heart sounds. No murmur heard.  Pulmonary:      Effort: Pulmonary effort is normal. No respiratory distress.      Breath sounds: Normal breath sounds. No wheezing or rales.   Abdominal:      General: There is no distension.      Palpations: Abdomen is soft.      Tenderness: There is no abdominal tenderness.   Genitourinary:     Comments: Patient has gross continuous hematuria - currently on intermittent suction, has thickened phimotic foreskin   Musculoskeletal:      Right lower leg: No edema.      Left lower leg: No edema.   Skin:     General: Skin is warm and dry.      Coloration: Skin is not jaundiced.   Neurological:      General: No focal deficit present.      Mental Status: He is alert.   Psychiatric:         Mood and Affect: Mood normal.         Behavior: Behavior normal.              CRANIAL NERVES     CN III, IV, VI   Pupils are equal, round, and reactive to light.       Significant Labs: All pertinent labs within the past 24 hours have been reviewed.  Blood Culture: No results for input(s): "LABBLOO" in the last 48 hours.  BMP:   Recent Labs   Lab 11/08/24  0420   GLU 96      K 3.8      CO2 28   BUN 21   CREATININE 1.0   CALCIUM 9.2     CBC:   Recent Labs   Lab 11/08/24  0420   WBC 7.42   HGB 11.2*   HCT 34.9*        Urine Culture: No results for input(s): "LABURIN" in the last 48 hours.  Urine Studies:   Recent Labs   Lab 11/08/24  0505   COLORU Red*   APPEARANCEUA Cloudy*   PHUR >8.0*   SPECGRAV 1.015   PROTEINUA 3+*   GLUCUA Negative   KETONESU Negative   BILIRUBINUA Negative   OCCULTUA 3+*   NITRITE Negative   UROBILINOGEN Negative   LEUKOCYTESUR 3+*   RBCUA >100*   WBCUA >100*   BACTERIA Few*   HYALINECASTS 13*       Significant Imaging: I have reviewed all pertinent imaging results/findings within the past 24 hours.  Assessment/Plan:     * Urinary tract " infection with hematuria  UA is positive, hematuria.      Continue ceftriaxone   Follow urine/blood cultures        Balanoposthitis  Patient has 1 day history of hematuria, noted to have phimotic thickened of foreskin .     Urology consult for possible circumcision      BPH (benign prostatic hyperplasia)  Continue tamsulosin        VTE Risk Mitigation (From admission, onward)           Ordered     Reason for No Pharmacological VTE Prophylaxis  Once        Question:  Reasons:  Answer:  Active Bleeding    11/08/24 0600     IP VTE HIGH RISK PATIENT  Once         11/08/24 0600     Place sequential compression device  Until discontinued         11/08/24 0600                       On 11/08/2024, patient should be placed in hospital observation services under my care.             Huan Pires MD  Department of Hospital Medicine  [facility] - Emergency Dept

## 2024-11-08 NOTE — ED PROVIDER NOTES
"Encounter Date: 2024       History     Chief Complaint   Patient presents with    Hematuria     Patient c/o hematuria since yesterday     HPI   Ish Maradiaga is a 89 y.o. M PMHX BPH and CHF who presents to the ED from nursing home with gross hematuria. He states he noticed the bleeding yesterday. He has never had symptoms like this before. He denies any penile pain, abdominal pain, nausea, vomiting, or dysuria. He wears a depend at baseline and denies any issue with urination.     Review of patient's allergies indicates:  No Known Allergies  Past Medical History:   Diagnosis Date    Age-related nuclear cataract, bilateral     Anxiety disorder, unspecified     BPH (benign prostatic hyperplasia)     Chronic constipation     Constipation     Dementia     Depression     Functional dyspepsia     Gastritis     Heart failure, unspecified     History of psychiatric care     Other lack of coordination     Peripheral vascular disease, unspecified     Personal history of other diseases of urinary system     Psychiatric problem     Therapy     Vitamin deficiency      Past Surgical History:   Procedure Laterality Date    CERVICAL SPINE SURGERY      HERNIA REPAIR  , 1992    x2     Family History   Problem Relation Name Age of Onset    Cancer Mother      Depression Brother      Alcohol abuse Brother       Social History     Tobacco Use    Smoking status: Former     Current packs/day: 0.00     Types: Cigarettes     Quit date: 1980     Years since quittin.8    Tobacco comments:     quit    Substance Use Topics    Alcohol use: No     Comment: "very light" states last etoh about 3 years ago (13)    Drug use: No     Review of Systems  See HPI  Physical Exam     Initial Vitals [24 0337]   BP Pulse Resp Temp SpO2   115/62 69 18 98.5 °F (36.9 °C) 98 %      MAP       --         Physical Exam    Constitutional: He appears well-developed and well-nourished. No distress.   Eyes: EOM are normal. Pupils are " equal, round, and reactive to light.   Neck:   Normal range of motion.  Cardiovascular:  Normal rate.           Pulmonary/Chest: No respiratory distress.   Abdominal: Abdomen is soft. He exhibits no distension. There is no abdominal tenderness.   Genitourinary: Uncircumcised. No penile tenderness. Discharge found.    Genitourinary Comments: gross blood coming from the penis with a slow ooze. Flat gray tinged macules to the foreskin extending down to the scrotum. Signs of excoriation to the foreskin and scrotum     Musculoskeletal:      Cervical back: Normal range of motion.     Neurological: He is oriented to person, place, and time. GCS score is 15. GCS eye subscore is 4. GCS verbal subscore is 5. GCS motor subscore is 6.   Skin: Skin is dry. Capillary refill takes less than 2 seconds.   Psychiatric: He has a normal mood and affect.         ED Course   Procedures  Labs Reviewed   CBC W/ AUTO DIFFERENTIAL - Abnormal       Result Value    WBC 7.42      RBC 3.61 (*)     Hemoglobin 11.2 (*)     Hematocrit 34.9 (*)     MCV 97      MCH 31.0      MCHC 32.1      RDW 13.3      Platelets 156      MPV 10.0      Immature Granulocytes 0.4      Gran # (ANC) 4.3      Immature Grans (Abs) 0.03      Lymph # 1.9      Mono # 0.9      Eos # 0.3      Baso # 0.03      nRBC 0      Gran % 58.0      Lymph % 25.1      Mono % 11.7      Eosinophil % 4.4      Basophil % 0.4      Differential Method Automated     COMPREHENSIVE METABOLIC PANEL - Abnormal    Sodium 142      Potassium 3.8      Chloride 109      CO2 28      Glucose 96      BUN 21      Creatinine 1.0      Calcium 9.2      Total Protein 6.1      Albumin 3.4 (*)     Total Bilirubin 0.4      Alkaline Phosphatase 80      AST 15      ALT 7 (*)     eGFR >60.0      Anion Gap 5 (*)    URINALYSIS, REFLEX TO URINE CULTURE - Abnormal    Specimen UA Urine, Clean Catch      Color, UA Red (*)     Appearance, UA Cloudy (*)     pH, UA >8.0 (*)     Specific Gravity, UA 1.015      Protein, UA 3+ (*)      Glucose, UA Negative      Ketones, UA Negative      Bilirubin (UA) Negative      Occult Blood UA 3+ (*)     Nitrite, UA Negative      Urobilinogen, UA Negative      Leukocytes, UA 3+ (*)     Narrative:     Specimen Source->Urine   URINALYSIS MICROSCOPIC - Abnormal    RBC, UA >100 (*)     WBC, UA >100 (*)     WBC Clumps, UA Many (*)     Bacteria Few (*)     Yeast, UA None      Hyaline Casts, UA 13 (*)     Microscopic Comment SEE COMMENT      Narrative:     Specimen Source->Urine   CULTURE, URINE   LIPASE    Lipase 11     PROTIME-INR    Prothrombin Time 11.7      INR 1.1            Imaging Results    None          Medications   cefTRIAXone injection 1 g (1 g Intravenous Given 11/8/24 0453)     Medical Decision Making             ED Course as of 11/08/24 0542   Fri Nov 08, 2024   0452 CBC W/ AUTO DIFFERENTIAL(!)  Normocytic anemia hgb 11.2 [MB]   0517 Protime-INR  WNL [MB]   0518 Lipase  WNL [MB]   0518 Comp. Metabolic Panel(!)  WNL [MB]   0540 Urinalysis Microscopic(!)  Significant RBC and WBC with 3+ leukocyte esterase [MB]   0541 Consulted medicine who will admit the patient [MB]      ED Course User Index  [MB] Keegan Muhammad MD        Ish Maradiaga is a 89 y.o. M PMHX BPH and CHF who presents to the ED from nursing home with gross hematuria. He states he noticed the bleeding yesterday. He has never had symptoms like this before. He denies any penile pain, abdominal pain, nausea, vomiting, or dysuria. He wears a depend at baseline and denies any issue with urination. Vitals upon arrival WNL. Pex significant for comfortable appearing older man laying on the stretcher in NAD. No abdominal TTP.  exam with gross blood coming from the penis with a slow ooze. Flat gray tinged macules to the foreskin extending down to the scrotum. Signs of excoriation to the foreskin and scrotum. Remainder of exam WNL. DDX includes but is not limited to, balanitis, phimosis, paraphimosis, trish gangrene, cellulitis, UTI, bladder  cancer, HSV. Patient has no complaints. Suspect his symptoms are likely due to balanitis. No vessicles appreciated HSV low on differential. Will send labs and UA. Patient will likely need urology consult due to bleeding with tightly adhered foreskin. Will not retract due to concern for precipitating phimosis. Please see ED course for updates.       Keegan Muhammad MD  [employer] Emergency Medicine PGY-4  11/8/2024 3:51 AM                   Clinical Impression:  Final diagnoses:  [R31.9] Hematuria, unspecified type (Primary)  [N47.6] Balanoposthitis                 Keegan Muhammad MD  Resident  11/08/24 0542

## 2024-11-08 NOTE — SUBJECTIVE & OBJECTIVE
Past Medical History:   Diagnosis Date    Age-related nuclear cataract, bilateral     Anxiety disorder, unspecified     BPH (benign prostatic hyperplasia)     Chronic constipation     Constipation     Dementia     Depression     Functional dyspepsia     Gastritis     Heart failure, unspecified     History of psychiatric care     Other lack of coordination     Peripheral vascular disease, unspecified     Personal history of other diseases of urinary system     Psychiatric problem     Therapy     Vitamin deficiency        Past Surgical History:   Procedure Laterality Date    CERVICAL SPINE SURGERY      HERNIA REPAIR  1991, 1992    x2       Review of patient's allergies indicates:  No Known Allergies    No current facility-administered medications on file prior to encounter.     Current Outpatient Medications on File Prior to Encounter   Medication Sig    acetaminophen (TYLENOL) 650 MG TbSR Take 650 mg by mouth every 8 (eight) hours.    ascorbic acid, vitamin C, (VITAMIN C) 500 MG tablet Take 500 mg by mouth once daily.    bisacodyL (FLEET) 10 mg/30 mL Enem Place 10 mg rectally once.    citalopram (CELEXA) 20 MG tablet Take 20 mg by mouth once daily.    clotrimazole-betamethasone 1-0.05% (LOTRISONE) cream Apply topically 2 (two) times daily.    dextromethorphan (DELSYM) 30 mg/5 mL liquid Take 60 mg by mouth 2 (two) times daily.    diphenhydrAMINE (SOMINEX) 25 mg tablet Take 25 mg by mouth nightly as needed for Insomnia.    furosemide (LASIX) 80 MG tablet Take 80 mg by mouth once daily.    guaiFENesin 100 mg/5 ml (ROBITUSSIN) 100 mg/5 mL syrup Take 200 mg by mouth 3 (three) times daily as needed for Cough.    melatonin 5 mg Cap Take by mouth.    ondansetron (ZOFRAN-ODT) 4 MG TbDL Take 4 mg by mouth once.    pantoprazole (PROTONIX) 40 MG tablet Take 40 mg by mouth once daily.    polyvinyl alcohol, artificial tears, (LIQUIFILM TEARS) 1.4 % ophthalmic solution 1 drop 2 (two) times daily.    potassium chloride (KLOR-CON) 10  "MEQ TbSR Take 10 mEq by mouth once daily.    promethazine (PHENERGAN) 25 MG tablet Take 25 mg by mouth every 6 (six) hours as needed for Nausea.    RANITIDINE HCL (ZANTAC ORAL) Take by mouth.    tamsulosin (FLOMAX) 0.4 mg Cap Take 1 capsule (0.4 mg total) by mouth every evening.    valACYclovir (VALTREX) 1000 MG tablet Take 1 tablet (1,000 mg total) by mouth 2 (two) times daily.    zinc sulfate (ZINCATE) 50 mg zinc (220 mg) capsule Take 220 mg by mouth once daily.    [DISCONTINUED] vilazodone (VIIBRYD) 20 mg Tab Take by mouth.     Family History       Problem Relation (Age of Onset)    Alcohol abuse Brother    Cancer Mother    Depression Brother          Tobacco Use    Smoking status: Former     Current packs/day: 0.00     Types: Cigarettes     Quit date: 1980     Years since quittin.8    Smokeless tobacco: Not on file    Tobacco comments:     quit    Substance and Sexual Activity    Alcohol use: No     Comment: "very light" states last etoh about 3 years ago (13)    Drug use: No    Sexual activity: Never     Review of Systems   Constitutional:  Negative for chills and fever.   HENT:  Negative for ear discharge and sore throat.    Eyes:  Negative for visual disturbance.   Respiratory:  Negative for chest tightness, shortness of breath and wheezing.    Cardiovascular:  Negative for chest pain, palpitations and leg swelling.   Gastrointestinal:  Negative for abdominal pain, blood in stool, diarrhea and vomiting.   Endocrine: Negative for polyuria.   Genitourinary:  Positive for hematuria, penile discharge and scrotal swelling. Negative for dysuria and testicular pain.   Musculoskeletal:  Negative for gait problem and myalgias.   Skin:  Positive for rash.   Neurological:  Negative for dizziness, weakness, numbness and headaches.   Psychiatric/Behavioral:  Negative for agitation.      Objective:     Vital Signs (Most Recent):  Temp: 98.5 °F (36.9 °C) (24 0337)  Pulse: 61 (24 0500)  Resp: 18 " "(11/08/24 0337)  BP: 124/60 (11/08/24 0500)  SpO2: 97 % (11/08/24 0500) Vital Signs (24h Range):  Temp:  [98.5 °F (36.9 °C)] 98.5 °F (36.9 °C)  Pulse:  [61-69] 61  Resp:  [18] 18  SpO2:  [97 %-98 %] 97 %  BP: (115-124)/(60-62) 124/60     Weight: 70.3 kg (155 lb)  Body mass index is 22.89 kg/m².     Physical Exam  Constitutional:       General: He is not in acute distress.     Appearance: Normal appearance. He is not toxic-appearing.   HENT:      Head: Normocephalic and atraumatic.      Nose: No congestion.      Mouth/Throat:      Mouth: Mucous membranes are moist.      Pharynx: Oropharynx is clear.   Eyes:      General: No scleral icterus.     Extraocular Movements: Extraocular movements intact.      Pupils: Pupils are equal, round, and reactive to light.   Cardiovascular:      Rate and Rhythm: Normal rate and regular rhythm.      Pulses: Normal pulses.      Heart sounds: Normal heart sounds. No murmur heard.  Pulmonary:      Effort: Pulmonary effort is normal. No respiratory distress.      Breath sounds: Normal breath sounds. No wheezing or rales.   Abdominal:      General: There is no distension.      Palpations: Abdomen is soft.      Tenderness: There is no abdominal tenderness.   Genitourinary:     Comments: Patient has gross continuous hematuria - currently on intermittent suction, has thickened phimotic foreskin   Musculoskeletal:      Right lower leg: No edema.      Left lower leg: No edema.   Skin:     General: Skin is warm and dry.      Coloration: Skin is not jaundiced.   Neurological:      General: No focal deficit present.      Mental Status: He is alert.   Psychiatric:         Mood and Affect: Mood normal.         Behavior: Behavior normal.              CRANIAL NERVES     CN III, IV, VI   Pupils are equal, round, and reactive to light.       Significant Labs: All pertinent labs within the past 24 hours have been reviewed.  Blood Culture: No results for input(s): "LABBLOO" in the last 48 hours.  BMP: " "  Recent Labs   Lab 11/08/24  0420   GLU 96      K 3.8      CO2 28   BUN 21   CREATININE 1.0   CALCIUM 9.2     CBC:   Recent Labs   Lab 11/08/24  0420   WBC 7.42   HGB 11.2*   HCT 34.9*        Urine Culture: No results for input(s): "LABURIN" in the last 48 hours.  Urine Studies:   Recent Labs   Lab 11/08/24  0505   COLORU Red*   APPEARANCEUA Cloudy*   PHUR >8.0*   SPECGRAV 1.015   PROTEINUA 3+*   GLUCUA Negative   KETONESU Negative   BILIRUBINUA Negative   OCCULTUA 3+*   NITRITE Negative   UROBILINOGEN Negative   LEUKOCYTESUR 3+*   RBCUA >100*   WBCUA >100*   BACTERIA Few*   HYALINECASTS 13*       Significant Imaging: I have reviewed all pertinent imaging results/findings within the past 24 hours.  "

## 2024-11-08 NOTE — PROGRESS NOTES
0850:  Rcvd pt to RM 3012, pt AAOx4 v/s stable, on RA.  No s/s acute distress noted, pt denied pain, n/v, SOB, dizziness, HA.  Dual skin assessment performed, see chart.  Adult brief with moderate amount of bright red blood, blood observed eminating from meatus of penis.  Attending MD is aware.  Pt updated regarding plan of care, verbalized understanding, denied unmet needs.      1440:  Ok for diet per Urology service, cardiac diet ordered.  Hematuria ongoing, brief changed with moderate amount bright red urine.

## 2024-11-08 NOTE — PLAN OF CARE
11/08/24 1541   COLLINS Message   Medicare Outpatient and Observation Notification regarding financial responsibility Other (comments)  (patient unable to sign.  left voicemail for son.)

## 2024-11-08 NOTE — HPI
89 year old male with PMH of BPH, CHF, dementia, h/o bladder cancer, depression, PVD, previous proteus UTI presented to ED from nursing home for gross hematuria for 1 day.  Denies previous occurrences.  Denies penile pain, abdominal pain, nausea/vomiting, dysuria.  Wears depend at baseline.  Denies fevers, weight loss, chest pain, SOB, nausea/vomiting, chills, diarrhea, rashes.    On examination patient noted to have discharge, gross blood oozing from penis, flat gray tinged macules on foreskin down to scrotum, excoriation of skin.  UA is positive for leuk esterase, >100 RBCs.  Patient was given IV ceftriaxone for UTI.

## 2024-11-08 NOTE — ASSESSMENT & PLAN NOTE
Patient has 1 day history of hematuria, noted to have phimotic thickened of foreskin .     Urology consult for possible circumcision

## 2024-11-08 NOTE — HOSPITAL COURSE
Patient admitted to Hospital Medicine and received intravenous antibiotic therapy for urinary tract infection.  Urine culture results followed.  Patient noted to have balanitis and possible genital herpes, duration and prior history unknown.  Patient placed on oral Valtrex therapy.  Urology consultation placed.  PT OT consulted.  Per Urology patient will likely need circumcision and possible biopsy.  Follow up outpatient.  Ucx grew providencia. Pt discharge back to nursing facility.     Physical Exam  Constitutional:       General: He is not in acute distress.  Cardiovascular:      Rate and Rhythm: Normal rate.   Pulmonary:      Effort: No respiratory distress.      Breath sounds: No wheezing.

## 2024-11-09 LAB
ANION GAP SERPL CALC-SCNC: 4 MMOL/L (ref 8–16)
BASOPHILS # BLD AUTO: 0.02 K/UL (ref 0–0.2)
BASOPHILS NFR BLD: 0.4 % (ref 0–1.9)
BUN SERPL-MCNC: 22 MG/DL (ref 10–30)
CALCIUM SERPL-MCNC: 8.9 MG/DL (ref 8.7–10.5)
CHLORIDE SERPL-SCNC: 108 MMOL/L (ref 95–110)
CO2 SERPL-SCNC: 29 MMOL/L (ref 23–29)
CREAT SERPL-MCNC: 1.1 MG/DL (ref 0.5–1.4)
DIFFERENTIAL METHOD BLD: ABNORMAL
EOSINOPHIL # BLD AUTO: 0.1 K/UL (ref 0–0.5)
EOSINOPHIL NFR BLD: 2 % (ref 0–8)
ERYTHROCYTE [DISTWIDTH] IN BLOOD BY AUTOMATED COUNT: 13.4 % (ref 11.5–14.5)
EST. GFR  (NO RACE VARIABLE): >60 ML/MIN/1.73 M^2
GLUCOSE SERPL-MCNC: 162 MG/DL (ref 70–110)
HCT VFR BLD AUTO: 31.4 % (ref 40–54)
HGB BLD-MCNC: 10.2 G/DL (ref 14–18)
IMM GRANULOCYTES # BLD AUTO: 0.01 K/UL (ref 0–0.04)
IMM GRANULOCYTES NFR BLD AUTO: 0.2 % (ref 0–0.5)
LYMPHOCYTES # BLD AUTO: 1.3 K/UL (ref 1–4.8)
LYMPHOCYTES NFR BLD: 23.5 % (ref 18–48)
MAGNESIUM SERPL-MCNC: 1.9 MG/DL (ref 1.6–2.6)
MCH RBC QN AUTO: 31.2 PG (ref 27–31)
MCHC RBC AUTO-ENTMCNC: 32.5 G/DL (ref 32–36)
MCV RBC AUTO: 96 FL (ref 82–98)
MONOCYTES # BLD AUTO: 0.5 K/UL (ref 0.3–1)
MONOCYTES NFR BLD: 8.9 % (ref 4–15)
NEUTROPHILS # BLD AUTO: 3.5 K/UL (ref 1.8–7.7)
NEUTROPHILS NFR BLD: 65 % (ref 38–73)
NRBC BLD-RTO: 0 /100 WBC
PHOSPHATE SERPL-MCNC: 2.4 MG/DL (ref 2.7–4.5)
PLATELET # BLD AUTO: 158 K/UL (ref 150–450)
PMV BLD AUTO: 10.3 FL (ref 9.2–12.9)
POTASSIUM SERPL-SCNC: 4.1 MMOL/L (ref 3.5–5.1)
RBC # BLD AUTO: 3.27 M/UL (ref 4.6–6.2)
SODIUM SERPL-SCNC: 141 MMOL/L (ref 136–145)
WBC # BLD AUTO: 5.4 K/UL (ref 3.9–12.7)

## 2024-11-09 PROCEDURE — 25000003 PHARM REV CODE 250: Performed by: STUDENT IN AN ORGANIZED HEALTH CARE EDUCATION/TRAINING PROGRAM

## 2024-11-09 PROCEDURE — 25000003 PHARM REV CODE 250: Performed by: INTERNAL MEDICINE

## 2024-11-09 PROCEDURE — 84100 ASSAY OF PHOSPHORUS: CPT | Performed by: STUDENT IN AN ORGANIZED HEALTH CARE EDUCATION/TRAINING PROGRAM

## 2024-11-09 PROCEDURE — 85025 COMPLETE CBC W/AUTO DIFF WBC: CPT | Performed by: STUDENT IN AN ORGANIZED HEALTH CARE EDUCATION/TRAINING PROGRAM

## 2024-11-09 PROCEDURE — 21400001 HC TELEMETRY ROOM

## 2024-11-09 PROCEDURE — 63600175 PHARM REV CODE 636 W HCPCS: Performed by: STUDENT IN AN ORGANIZED HEALTH CARE EDUCATION/TRAINING PROGRAM

## 2024-11-09 PROCEDURE — 80048 BASIC METABOLIC PNL TOTAL CA: CPT | Performed by: STUDENT IN AN ORGANIZED HEALTH CARE EDUCATION/TRAINING PROGRAM

## 2024-11-09 PROCEDURE — 83735 ASSAY OF MAGNESIUM: CPT | Performed by: STUDENT IN AN ORGANIZED HEALTH CARE EDUCATION/TRAINING PROGRAM

## 2024-11-09 PROCEDURE — 36415 COLL VENOUS BLD VENIPUNCTURE: CPT | Performed by: STUDENT IN AN ORGANIZED HEALTH CARE EDUCATION/TRAINING PROGRAM

## 2024-11-09 PROCEDURE — 27000207 HC ISOLATION

## 2024-11-09 RX ADMIN — POTASSIUM CHLORIDE 10 MEQ: 750 CAPSULE, EXTENDED RELEASE ORAL at 09:11

## 2024-11-09 RX ADMIN — ZINC SULFATE 220 MG (50 MG) CAPSULE 220 MG: CAPSULE at 09:11

## 2024-11-09 RX ADMIN — VALACYCLOVIR 1000 MG: 500 TABLET, FILM COATED ORAL at 09:11

## 2024-11-09 RX ADMIN — FUROSEMIDE 80 MG: 40 TABLET ORAL at 09:11

## 2024-11-09 RX ADMIN — CITALOPRAM HYDROBROMIDE 20 MG: 20 TABLET ORAL at 09:11

## 2024-11-09 RX ADMIN — OXYCODONE HYDROCHLORIDE AND ACETAMINOPHEN 500 MG: 500 TABLET ORAL at 09:11

## 2024-11-09 RX ADMIN — TAMSULOSIN HYDROCHLORIDE 0.4 MG: 0.4 CAPSULE ORAL at 10:11

## 2024-11-09 RX ADMIN — VALACYCLOVIR 1000 MG: 500 TABLET, FILM COATED ORAL at 10:11

## 2024-11-09 RX ADMIN — CEFTRIAXONE SODIUM 1 G: 2 INJECTION, POWDER, FOR SOLUTION INTRAMUSCULAR; INTRAVENOUS at 10:11

## 2024-11-09 NOTE — SUBJECTIVE & OBJECTIVE
Interval History:  Patient seen and examined this morning, denies any hematuria.  Urine cultures growing Gram-negative rods.    Review of Systems   Constitutional:  Negative for chills and diaphoresis.   Respiratory:  Negative for shortness of breath.    Cardiovascular:  Negative for chest pain.     Objective:     Vital Signs (Most Recent):  Temp: 98.6 °F (37 °C) (11/09/24 1143)  Pulse: 69 (11/09/24 1143)  Resp: 15 (11/09/24 1143)  BP: (!) 108/56 (11/09/24 1143)  SpO2: 98 % (11/09/24 1143) Vital Signs (24h Range):  Temp:  [98 °F (36.7 °C)-99.4 °F (37.4 °C)] 98.6 °F (37 °C)  Pulse:  [68-82] 69  Resp:  [15-18] 15  SpO2:  [96 %-98 %] 98 %  BP: (108-125)/(56-67) 108/56     Weight: 67.5 kg (148 lb 13 oz)  Body mass index is 21.98 kg/m².    Intake/Output Summary (Last 24 hours) at 11/9/2024 1232  Last data filed at 11/9/2024 0840  Gross per 24 hour   Intake 420 ml   Output --   Net 420 ml         Physical Exam  Constitutional:       General: He is not in acute distress.  Cardiovascular:      Rate and Rhythm: Normal rate.   Pulmonary:      Effort: No respiratory distress.      Breath sounds: No wheezing.   Abdominal:      Palpations: Abdomen is soft.   Neurological:      Mental Status: He is alert.             Significant Labs: All pertinent labs within the past 24 hours have been reviewed.  CBC:   Recent Labs   Lab 11/08/24 0420 11/09/24  0831   WBC 7.42 5.40   HGB 11.2* 10.2*   HCT 34.9* 31.4*    158     CMP:   Recent Labs   Lab 11/08/24 0420 11/09/24  0831    141   K 3.8 4.1    108   CO2 28 29   GLU 96 162*   BUN 21 22   CREATININE 1.0 1.1   CALCIUM 9.2 8.9   PROT 6.1  --    ALBUMIN 3.4*  --    BILITOT 0.4  --    ALKPHOS 80  --    AST 15  --    ALT 7*  --    ANIONGAP 5* 4*       Significant Imaging: I have reviewed all pertinent imaging results/findings within the past 24 hours.

## 2024-11-09 NOTE — PLAN OF CARE
Problem: Adult Inpatient Plan of Care  Goal: Plan of Care Review  Outcome: Progressing  Goal: Patient-Specific Goal (Individualized)  Outcome: Progressing  Goal: Absence of Hospital-Acquired Illness or Injury  Outcome: Progressing  Goal: Optimal Comfort and Wellbeing  Outcome: Progressing  Goal: Readiness for Transition of Care  Outcome: Progressing     Problem: Infection  Goal: Absence of Infection Signs and Symptoms  Outcome: Progressing     Problem: Fall Injury Risk  Goal: Absence of Fall and Fall-Related Injury  Outcome: Progressing     Problem: Skin Injury Risk Increased  Goal: Skin Health and Integrity  Outcome: Progressing     Problem: Wound  Goal: Optimal Coping  Outcome: Progressing  Goal: Optimal Functional Ability  Outcome: Progressing  Goal: Absence of Infection Signs and Symptoms  Outcome: Progressing  Goal: Improved Oral Intake  Outcome: Progressing  Goal: Optimal Pain Control and Function  Outcome: Progressing  Goal: Skin Health and Integrity  Outcome: Progressing  Goal: Optimal Wound Healing  Outcome: Progressing

## 2024-11-09 NOTE — PROGRESS NOTES
[facility] Medicine  Progress Note    Patient Name: Ish Maradiaga  MRN: [id number]  Patient Class: OP- Observation   Admission Date: 11/8/2024  Length of Stay: 0 days  Attending Physician: Estella Yang MD  Primary Care Provider: Peyton, Primary Doctor        Subjective:     Principal Problem:Urinary tract infection with hematuria        HPI:  89 year old male with PMH of BPH, CHF, dementia, h/o bladder cancer, depression, PVD, previous proteus UTI presented to ED from nursing home for gross hematuria for 1 day.  Denies previous occurrences.  Denies penile pain, abdominal pain, nausea/vomiting, dysuria.  Wears depend at baseline.  Denies fevers, weight loss, chest pain, SOB, nausea/vomiting, chills, diarrhea, rashes.    On examination patient noted to have discharge, gross blood oozing from penis, flat gray tinged macules on foreskin down to scrotum, excoriation of skin.  UA is positive for leuk esterase, >100 RBCs.  Patient was given IV ceftriaxone for UTI.      Overview/Hospital Course:  Patient admitted to Hospital Medicine and received intravenous antibiotic therapy for urinary tract infection.  Urine culture results followed.  Patient noted to have balanitis and possible genital herpes, duration and prior history unknown.  Patient placed on oral Valtrex therapy.  Urology consultation placed.  PT OT consulted.  Per Urology patient will likely need circumcision and possible biopsy.  Follow up outpatient.  Discharge pending urine cultures.    Interval History:  Patient seen and examined this morning, denies any hematuria.  Urine cultures growing Gram-negative rods.    Review of Systems   Constitutional:  Negative for chills and diaphoresis.   Respiratory:  Negative for shortness of breath.    Cardiovascular:  Negative for chest pain.     Objective:     Vital Signs (Most Recent):  Temp: 98.6 °F (37 °C) (11/09/24 1143)  Pulse: 69 (11/09/24 1143)  Resp: 15 (11/09/24 1143)  BP: (!) 108/56 (11/09/24  1143)  SpO2: 98 % (11/09/24 1143) Vital Signs (24h Range):  Temp:  [98 °F (36.7 °C)-99.4 °F (37.4 °C)] 98.6 °F (37 °C)  Pulse:  [68-82] 69  Resp:  [15-18] 15  SpO2:  [96 %-98 %] 98 %  BP: (108-125)/(56-67) 108/56     Weight: 67.5 kg (148 lb 13 oz)  Body mass index is 21.98 kg/m².    Intake/Output Summary (Last 24 hours) at 11/9/2024 1232  Last data filed at 11/9/2024 0840  Gross per 24 hour   Intake 420 ml   Output --   Net 420 ml         Physical Exam  Constitutional:       General: He is not in acute distress.  Cardiovascular:      Rate and Rhythm: Normal rate.   Pulmonary:      Effort: No respiratory distress.      Breath sounds: No wheezing.   Abdominal:      Palpations: Abdomen is soft.   Neurological:      Mental Status: He is alert.             Significant Labs: All pertinent labs within the past 24 hours have been reviewed.  CBC:   Recent Labs   Lab 11/08/24 0420 11/09/24  0831   WBC 7.42 5.40   HGB 11.2* 10.2*   HCT 34.9* 31.4*    158     CMP:   Recent Labs   Lab 11/08/24 0420 11/09/24  0831    141   K 3.8 4.1    108   CO2 28 29   GLU 96 162*   BUN 21 22   CREATININE 1.0 1.1   CALCIUM 9.2 8.9   PROT 6.1  --    ALBUMIN 3.4*  --    BILITOT 0.4  --    ALKPHOS 80  --    AST 15  --    ALT 7*  --    ANIONGAP 5* 4*       Significant Imaging: I have reviewed all pertinent imaging results/findings within the past 24 hours.    Assessment/Plan:      * Urinary tract infection with hematuria  Continue Rocephin   Cultures growing Gram-negative rods, follow up susceptibility identification      BPH (benign prostatic hyperplasia)  Continue tamsulosin      Balanoposthitis  Urology recommended follow up outpatient for possible circumcision and biopsy      VTE Risk Mitigation (From admission, onward)           Ordered     Reason for No Pharmacological VTE Prophylaxis  Once        Question:  Reasons:  Answer:  Active Bleeding    11/08/24 0600     IP VTE HIGH RISK PATIENT  Once         11/08/24 0600      Place sequential compression device  Until discontinued         11/08/24 0600                    Discharge Planning   PRASHANTH: 11/10/2024     Code Status: Full Code   Is the patient medically ready for discharge?:     Reason for patient still in hospital (select all that apply): Patient trending condition  Discharge Plan A: Return to nursing home                  Estella Yang MD  Department of Hospital Medicine   [facility]

## 2024-11-09 NOTE — ASSESSMENT & PLAN NOTE
Continue Rocephin   Cultures growing Gram-negative rods, follow up susceptibility identification

## 2024-11-09 NOTE — PLAN OF CARE
Ish Maradiaga has warranted treatment spanning two or more midnights of hospital level care for the management of  UTI with Hematuria . He continues to require IV antibiotics, daily labs, further testing/imaging, monitoring of vital signs, medication adjustments, and further evaluation by consultants. His condition is also complicated by the following comorbidities: Heart failure and Bladder cancer,BPH,PVD,Dementia .

## 2024-11-10 VITALS
TEMPERATURE: 98 F | HEART RATE: 59 BPM | HEIGHT: 69 IN | BODY MASS INDEX: 22.04 KG/M2 | DIASTOLIC BLOOD PRESSURE: 60 MMHG | SYSTOLIC BLOOD PRESSURE: 102 MMHG | RESPIRATION RATE: 18 BRPM | WEIGHT: 148.81 LBS | OXYGEN SATURATION: 97 %

## 2024-11-10 PROBLEM — R31.9 URINARY TRACT INFECTION WITH HEMATURIA: Status: RESOLVED | Noted: 2024-11-08 | Resolved: 2024-11-10

## 2024-11-10 PROBLEM — N39.0 URINARY TRACT INFECTION WITH HEMATURIA: Status: RESOLVED | Noted: 2024-11-08 | Resolved: 2024-11-10

## 2024-11-10 LAB — BACTERIA UR CULT: ABNORMAL

## 2024-11-10 PROCEDURE — 25000003 PHARM REV CODE 250: Performed by: STUDENT IN AN ORGANIZED HEALTH CARE EDUCATION/TRAINING PROGRAM

## 2024-11-10 PROCEDURE — 63600175 PHARM REV CODE 636 W HCPCS: Performed by: INTERNAL MEDICINE

## 2024-11-10 PROCEDURE — 25000003 PHARM REV CODE 250: Performed by: INTERNAL MEDICINE

## 2024-11-10 RX ORDER — CEPHALEXIN 250 MG/1
500 CAPSULE ORAL EVERY 6 HOURS
Status: DISCONTINUED | OUTPATIENT
Start: 2024-11-10 | End: 2024-11-10 | Stop reason: HOSPADM

## 2024-11-10 RX ORDER — CEPHALEXIN 500 MG/1
500 CAPSULE ORAL EVERY 6 HOURS
Qty: 20 CAPSULE | Refills: 0 | Status: SHIPPED | OUTPATIENT
Start: 2024-11-10 | End: 2024-11-15

## 2024-11-10 RX ADMIN — FUROSEMIDE 80 MG: 40 TABLET ORAL at 09:11

## 2024-11-10 RX ADMIN — LIDOCAINE HYDROCHLORIDE 500 MG: 10 INJECTION, SOLUTION INFILTRATION; PERINEURAL at 01:11

## 2024-11-10 RX ADMIN — ZINC SULFATE 220 MG (50 MG) CAPSULE 220 MG: CAPSULE at 09:11

## 2024-11-10 RX ADMIN — CEPHALEXIN 500 MG: 250 CAPSULE ORAL at 12:11

## 2024-11-10 RX ADMIN — PANTOPRAZOLE SODIUM 40 MG: 40 TABLET, DELAYED RELEASE ORAL at 05:11

## 2024-11-10 RX ADMIN — CITALOPRAM HYDROBROMIDE 20 MG: 20 TABLET ORAL at 09:11

## 2024-11-10 RX ADMIN — OXYCODONE HYDROCHLORIDE AND ACETAMINOPHEN 500 MG: 500 TABLET ORAL at 09:11

## 2024-11-10 RX ADMIN — VALACYCLOVIR 1000 MG: 500 TABLET, FILM COATED ORAL at 09:11

## 2024-11-10 RX ADMIN — POTASSIUM CHLORIDE 10 MEQ: 750 CAPSULE, EXTENDED RELEASE ORAL at 09:11

## 2024-11-10 NOTE — NURSING
Lost of IV access while administering a 2 Gr Dose of rocephin. Approximately 1 Gr. Had gone in.  Pt became agitated and  refused to allow  for another IV.  Notified Dr Monreal. New order to give Rocephin 500mg IM was noted .

## 2024-11-10 NOTE — PLAN OF CARE
Discharge orders have been faxed and reviewed  Per Alex, pt is able to return back to Facility ([facility]). Number for report is [phone]. Informed Nurse of information. Per Alex , transportation will be arrange by facility, will contact [facility] with transportation ETA.    No other needs indicated     11/10/24 1232   Final Note   Assessment Type Final Discharge Note   Anticipated Discharge Disposition [facility]   What phone number can be called within the next 1-3 days to see how you are doing after discharge? [phone]   Post-Acute Status   Discharge Delays None known at this time

## 2024-11-10 NOTE — PROGRESS NOTES
11/10/24 1000   Discharge Delays   Discharge Delays (!) Ambulance Transport/Facility Transport

## 2024-11-10 NOTE — DISCHARGE SUMMARY
[facility] Medicine  Discharge Summary      Patient Name: Ish Maradiaga  MRN: [id number]  FRANCHESKA: [id number]  Patient Class: IP- Inpatient  Admission Date: 11/8/2024  Hospital Length of Stay: 1 days  Discharge Date and Time: 11/10/2024  2:45 PM  Attending Physician: Peyton att. providers found   Discharging Provider: Estella Yang MD  Primary Care Provider: Peyton, Primary Doctor    Primary Care Team: Networked reference to record PCT     HPI:   89 year old male with PMH of BPH, CHF, dementia, h/o bladder cancer, depression, PVD, previous proteus UTI presented to ED from nursing home for gross hematuria for 1 day.  Denies previous occurrences.  Denies penile pain, abdominal pain, nausea/vomiting, dysuria.  Wears depend at baseline.  Denies fevers, weight loss, chest pain, SOB, nausea/vomiting, chills, diarrhea, rashes.    On examination patient noted to have discharge, gross blood oozing from penis, flat gray tinged macules on foreskin down to scrotum, excoriation of skin.  UA is positive for leuk esterase, >100 RBCs.  Patient was given IV ceftriaxone for UTI.      * No surgery found *      Hospital Course:   Patient admitted to Hospital Medicine and received intravenous antibiotic therapy for urinary tract infection.  Urine culture results followed.  Patient noted to have balanitis and possible genital herpes, duration and prior history unknown.  Patient placed on oral Valtrex therapy.  Urology consultation placed.  PT OT consulted.  Per Urology patient will likely need circumcision and possible biopsy.  Follow up outpatient.  Ucx grew providencia. Pt discharge back to nursing facility.     Physical Exam  Constitutional:       General: He is not in acute distress.  Cardiovascular:      Rate and Rhythm: Normal rate.   Pulmonary:      Effort: No respiratory distress.      Breath sounds: No wheezing.      Goals of Care Treatment Preferences:  Code Status: Full Code      SDOH Screening:  The patient was  screened for utility difficulties, food insecurity, transport difficulties, housing insecurity, and interpersonal safety and there were no concerns identified this admission.     Consults:   Consults (From admission, onward)          Status Ordering Provider     Inpatient consult to Urology  Once        Provider:  Gonzalez Rios MD    Acknowledged JENNIFER DIGGS            No new Assessment & Plan notes have been filed under this hospital service since the last note was generated.  Service: Hospital Medicine    Final Active Diagnoses:    Diagnosis Date Noted POA    Balanoposthitis [N47.6] 11/08/2024 Yes    BPH (benign prostatic hyperplasia) [N40.0] 11/08/2024 Yes      Problems Resolved During this Admission:    Diagnosis Date Noted Date Resolved POA    PRINCIPAL PROBLEM:  Urinary tract infection with hematuria [N39.0, R31.9] 11/08/2024 11/10/2024 Yes       Discharged Condition: stable    Disposition: Home or Self Care    Follow Up:   Follow-up Information       Gonzalez Rios MD Follow up in 1 week(s).    Specialty: Urology  Contact information:  [address] [place], [place] [address] [phone]                           Patient Instructions:      Diet Adult Regular     Notify your health care provider if you experience any of the following:  temperature >100.4     Notify your health care provider if you experience any of the following:  persistent nausea and vomiting or diarrhea     Notify your health care provider if you experience any of the following:  severe uncontrolled pain     Notify your health care provider if you experience any of the following:  redness, tenderness, or signs of infection (pain, swelling, redness, odor or green/yellow discharge around incision site)     Activity as tolerated       Significant Diagnostic Studies: Labs: CMP   Recent Labs   Lab 11/09/24  0831      K 4.1      CO2 29   *   BUN 22   CREATININE 1.1   CALCIUM 8.9   ANIONGAP 4*    and CBC    Recent Labs   Lab 11/09/24  0831   WBC 5.40   HGB 10.2*   HCT 31.4*          Pending Diagnostic Studies:       None           Medications:  Reconciled Home Medications:      Medication List        START taking these medications      cephALEXin 500 MG capsule  Commonly known as: KEFLEX  Take 1 capsule (500 mg total) by mouth every 6 (six) hours. for 5 days            CONTINUE taking these medications      citalopram 20 MG tablet  Commonly known as: CeleXA  Take 20 mg by mouth once daily.     clotrimazole-betamethasone 1-0.05% cream  Commonly known as: LOTRISONE  Apply topically 2 (two) times daily.     dextromethorphan 30 mg/5 mL liquid  Commonly known as: DELSYM  Take 60 mg by mouth 2 (two) times daily.     diphenhydrAMINE 25 mg tablet  Commonly known as: SOMINEX  Take 25 mg by mouth nightly as needed for Insomnia.     furosemide 80 MG tablet  Commonly known as: LASIX  Take 80 mg by mouth once daily.     guaiFENesin 100 mg/5 ml 100 mg/5 mL syrup  Commonly known as: ROBITUSSIN  Take 200 mg by mouth 3 (three) times daily as needed for Cough.     melatonin 5 mg Cap  Take by mouth.     ondansetron 4 MG Tbdl  Commonly known as: ZOFRAN-ODT  Take 4 mg by mouth once.     pantoprazole 40 MG tablet  Commonly known as: PROTONIX  Take 40 mg by mouth once daily.     polyethylene glycol 17 gram Pwpk  Commonly known as: GLYCOLAX  Take 17 g by mouth once daily.     polyvinyl alcohol (artificial tears) 1.4 % ophthalmic solution  Commonly known as: LIQUIFILM TEARS  1 drop 2 (two) times daily.     potassium chloride 10 MEQ Tbsr  Commonly known as: KLOR-CON  Take 10 mEq by mouth once daily.     promethazine 25 MG tablet  Commonly known as: PHENERGAN  Take 25 mg by mouth every 6 (six) hours as needed for Nausea.     tamsulosin 0.4 mg Cap  Commonly known as: FLOMAX  Take 1 capsule (0.4 mg total) by mouth every evening.     THERA-M ORAL  Take 1 tablet by mouth once daily.     valACYclovir 1000 MG tablet  Commonly known as:  VALTREX  Take 1 tablet (1,000 mg total) by mouth 2 (two) times daily.     VITAMIN C 500 MG tablet  Generic drug: ascorbic acid (vitamin C)  Take 500 mg by mouth once daily.     zinc sulfate 50 mg zinc (220 mg) capsule  Commonly known as: ZINCATE  Take 220 mg by mouth once daily.            STOP taking these medications      bisacodyL 10 mg/30 mL Enem  Commonly known as: FLEET     ZANTAC ORAL              Indwelling Lines/Drains at time of discharge:   Lines/Drains/Airways       None                   Time spent on the discharge of patient: 33 minutes         Estella Yang MD  Department of Hospital Medicine  [facility]

## 2024-11-10 NOTE — CARE UPDATE
RN SAYS LOST IV ACCESS AND PATIENT REFUSING ANOTHER IV         NEEDING   Rocephin 2 grams IV for UTI sepsis          RN thinks he got 1 gram in before IV was lost         I ordered 500 mg EXTRA Rocephin   IM once now

## 2024-11-10 NOTE — NURSING
Discharge instructions given to patient to take with him to nursing home. Patient verbalizes understanding. PIV removed. Transferred to stretcher for transport via [facility]. Left floor safely with all belongings.